# Patient Record
Sex: FEMALE | Race: WHITE | NOT HISPANIC OR LATINO | URBAN - NONMETROPOLITAN AREA
[De-identification: names, ages, dates, MRNs, and addresses within clinical notes are randomized per-mention and may not be internally consistent; named-entity substitution may affect disease eponyms.]

---

## 2017-01-02 ENCOUNTER — OFFICE VISIT (OUTPATIENT)
Dept: RETAIL CLINIC | Facility: CLINIC | Age: 62
End: 2017-01-02

## 2017-01-02 VITALS
BODY MASS INDEX: 37.42 KG/M2 | HEART RATE: 69 BPM | SYSTOLIC BLOOD PRESSURE: 118 MMHG | TEMPERATURE: 98.8 F | DIASTOLIC BLOOD PRESSURE: 68 MMHG | WEIGHT: 218 LBS | RESPIRATION RATE: 18 BRPM | OXYGEN SATURATION: 97 %

## 2017-01-02 DIAGNOSIS — J01.00 SUBACUTE MAXILLARY SINUSITIS: Primary | ICD-10-CM

## 2017-01-02 PROCEDURE — 99213 OFFICE O/P EST LOW 20 MIN: CPT | Performed by: NURSE PRACTITIONER

## 2017-01-02 RX ORDER — AZITHROMYCIN 250 MG/1
TABLET, FILM COATED ORAL
Qty: 6 TABLET | Refills: 0 | Status: SHIPPED | OUTPATIENT
Start: 2017-01-02 | End: 2017-01-29

## 2017-01-02 NOTE — PATIENT INSTRUCTIONS

## 2017-01-02 NOTE — PROGRESS NOTES
Ariane Sigala is a 61 y.o. female.     Sinusitis   This is a recurrent problem. The current episode started in the past 7 days. The problem has been gradually worsening since onset. The maximum temperature recorded prior to her arrival was 100.4 - 100.9 F. The fever has been present for less than 1 day. Her pain is at a severity of 3/10. The pain is mild. Associated symptoms include congestion, coughing, ear pain, headaches, sinus pressure and a sore throat. Past treatments include oral decongestants. The treatment provided mild relief.        The following portions of the patient's history were reviewed and updated as appropriate: allergies, current medications, past family history, past medical history, past social history, past surgical history and problem list.    Review of Systems   HENT: Positive for congestion, ear pain, sinus pressure and sore throat.    Respiratory: Positive for cough.    Neurological: Positive for headaches.   All other systems reviewed and are negative.    Visit Vitals   • /68   • Pulse 69   • Temp 98.8 °F (37.1 °C)   • Resp 18   • Wt 218 lb (98.9 kg)   • SpO2 97%   • BMI 37.42 kg/m2      Objective   Physical Exam   Constitutional: She appears well-developed.   HENT:   Head: Normocephalic.   Right Ear: External ear normal. Tympanic membrane is erythematous.   Left Ear: External ear normal. Tympanic membrane is erythematous.   Nose: Mucosal edema and rhinorrhea present. Right sinus exhibits maxillary sinus tenderness. Left sinus exhibits maxillary sinus tenderness.   Mouth/Throat: Posterior oropharyngeal erythema present.   Eyes: Conjunctivae are normal. Pupils are equal, round, and reactive to light.   Neck: Normal range of motion. Neck supple.   Cardiovascular: Normal rate, regular rhythm and normal heart sounds.    Pulmonary/Chest: Effort normal and breath sounds normal.   Skin: Skin is warm.   Vitals reviewed.      No results found for this or any previous visit.    Assessment/Plan   Aida was seen today for sinusitis.    Diagnoses and all orders for this visit:    Subacute maxillary sinusitis    Please review visit summary for additonal instructions.              Sunita Wilkinson, APRN

## 2017-01-29 ENCOUNTER — OFFICE VISIT (OUTPATIENT)
Dept: RETAIL CLINIC | Facility: CLINIC | Age: 62
End: 2017-01-29

## 2017-01-29 VITALS
WEIGHT: 221 LBS | DIASTOLIC BLOOD PRESSURE: 70 MMHG | OXYGEN SATURATION: 97 % | TEMPERATURE: 98.9 F | HEART RATE: 75 BPM | HEIGHT: 64 IN | SYSTOLIC BLOOD PRESSURE: 118 MMHG | BODY MASS INDEX: 37.73 KG/M2 | RESPIRATION RATE: 16 BRPM

## 2017-01-29 DIAGNOSIS — H66.002 ACUTE SUPPURATIVE OTITIS MEDIA OF LEFT EAR WITHOUT SPONTANEOUS RUPTURE OF TYMPANIC MEMBRANE, RECURRENCE NOT SPECIFIED: ICD-10-CM

## 2017-01-29 DIAGNOSIS — J10.1 INFLUENZA A: Primary | ICD-10-CM

## 2017-01-29 LAB
EXPIRATION DATE: ABNORMAL
FLUAV AG NPH QL: POSITIVE
FLUBV AG NPH QL: NEGATIVE
INTERNAL CONTROL: ABNORMAL
Lab: ABNORMAL

## 2017-01-29 PROCEDURE — 99213 OFFICE O/P EST LOW 20 MIN: CPT | Performed by: NURSE PRACTITIONER

## 2017-01-29 PROCEDURE — 87804 INFLUENZA ASSAY W/OPTIC: CPT | Performed by: NURSE PRACTITIONER

## 2017-01-29 RX ORDER — ASPIRIN 81 MG/1
81 TABLET ORAL DAILY
COMMUNITY

## 2017-01-29 RX ORDER — AMOXICILLIN 875 MG/1
875 TABLET, COATED ORAL 2 TIMES DAILY
Qty: 20 TABLET | Refills: 0 | Status: SHIPPED | OUTPATIENT
Start: 2017-01-29 | End: 2017-02-08

## 2017-01-29 RX ORDER — OSELTAMIVIR PHOSPHATE 75 MG/1
75 CAPSULE ORAL 2 TIMES DAILY
Qty: 10 CAPSULE | Refills: 0 | OUTPATIENT
Start: 2017-01-29 | End: 2017-02-03

## 2017-01-29 NOTE — MR AVS SNAPSHOT
Aida Sigala   1/29/2017 2:15 PM   Office Visit    Dept Phone:  [phone]   Encounter #:  [id number]    Provider:  Provider Bec Montague   Department:  [facility]                Your Full Care Plan              Today's Medication Changes          These changes are accurate as of: 1/29/17  3:34 PM.  If you have any questions, ask your nurse or doctor.               New Medication(s)Ordered:     amoxicillin 875 MG tablet   Commonly known as:  AMOXIL   Take 1 tablet by mouth 2 (Two) Times a Day for 10 days.   Started by:  Gildardo Garza       oseltamivir 75 MG capsule   Commonly known as:  TAMIFLU   Take 1 capsule by mouth 2 (Two) Times a Day for 5 days.   Started by:  Gildardo Garza         Stop taking medication(s)listed here:     azithromycin 250 MG tablet   Commonly known as:  ZITHROMAX Z-CARRINGTON   Stopped by:  Gildardo Garza                Where to Get Your Medications      These medications were sent to [address] [facility] [address] - [phone] [facility] [phone] [place] [address] [address] [place] [id number]     Phone:  [phone]     amoxicillin 875 MG tablet    oseltamivir 75 MG capsule                  Your Updated Medication List          This list is accurate as of: 1/29/17  3:34 PM.  Always use your most recent med list.                amoxicillin 875 MG tablet   Commonly known as:  AMOXIL   Take 1 tablet by mouth 2 (Two) Times a Day for 10 days.       aspirin 81 MG EC tablet       glimepiride 1 MG tablet   Commonly known as:  AMARYL       insulin aspart 100 UNIT/ML injection   Commonly known as:  novoLOG       insulin detemir 100 UNIT/ML injection   Commonly known as:  LEVEMIR       levothyroxine 100 MCG tablet   Commonly known as:  SYNTHROID, LEVOTHROID       lisinopril 10 MG tablet   Commonly known as:  PRINIVIL,ZESTRIL       metFORMIN 1000 MG tablet   Commonly known as:  GLUCOPHAGE    "   nadolol 20 MG tablet   Commonly known as:  CORGARD       oseltamivir 75 MG capsule   Commonly known as:  TAMIFLU   Take 1 capsule by mouth 2 (Two) Times a Day for 5 days.               We Performed the Following     POC Influenza A / B       You Were Diagnosed With        Codes Comments    Influenza A    -  Primary ICD-10-CM: J10.1  ICD-9-CM: 487.1     Acute suppurative otitis media of left ear without spontaneous rupture of tympanic membrane, recurrence not specified     ICD-10-CM: H66.002  ICD-9-CM: 382.00       Instructions    Influenza, Adult  Influenza (\"the flu\") is a viral infection of the respiratory tract. It occurs more often in winter months because people spend more time in close contact with one another. Influenza can make you feel very sick. Influenza easily spreads from person to person (contagious).  CAUSES   Influenza is caused by a virus that infects the respiratory tract. You can catch the virus by breathing in droplets from an infected person's cough or sneeze. You can also catch the virus by touching something that was recently contaminated with the virus and then touching your mouth, nose, or eyes.  RISKS AND COMPLICATIONS  You may be at risk for a more severe case of influenza if you smoke cigarettes, have diabetes, have chronic heart disease (such as heart failure) or lung disease (such as asthma), or if you have a weakened immune system. Elderly people and pregnant women are also at risk for more serious infections. The most common problem of influenza is a lung infection (pneumonia). Sometimes, this problem can require emergency medical care and may be life threatening.  SIGNS AND SYMPTOMS   Symptoms typically last 4 to 10 days and may include:  · Fever.  · Chills.  · Headache, body aches, and muscle aches.  · Sore throat.  · Chest discomfort and cough.  · Poor appetite.  · Weakness or feeling tired.  · Dizziness.  · Nausea or vomiting.  DIAGNOSIS   Diagnosis of influenza is often made " based on your history and a physical exam. A nose or throat swab test can be done to confirm the diagnosis.  TREATMENT   In mild cases, influenza goes away on its own. Treatment is directed at relieving symptoms. For more severe cases, your health care provider may prescribe antiviral medicines to shorten the sickness. Antibiotic medicines are not effective because the infection is caused by a virus, not by bacteria.  HOME CARE INSTRUCTIONS  · Take medicines only as directed by your health care provider.  · Use a cool mist humidifier to make breathing easier.  · Get plenty of rest until your temperature returns to normal. This usually takes 3 to 4 days.  · Drink enough fluid to keep your urine clear or pale yellow.  · Cover your mouth and nose when coughing or sneezing, and wash your hands well to prevent the virus from spreading.  · Stay home from work or school until the fever is gone for at least 1 full day.  PREVENTION   An annual influenza vaccination (flu shot) is the best way to avoid getting influenza. An annual flu shot is now routinely recommended for all adults in the [place]  SEEK MEDICAL CARE IF:  · You experience chest pain, your cough worsens, or you produce more mucus.  · You have nausea, vomiting, or diarrhea.  · Your fever returns or gets worse.  SEEK IMMEDIATE MEDICAL CARE IF:  · You have trouble breathing, you become short of breath, or your skin or nails become bluish.  · You have severe pain or stiffness in the neck.  · You develop a sudden headache, or pain in the face or ear.  · You have nausea or vomiting that you cannot control.  MAKE SURE YOU:   · Understand these instructions.  · Will watch your condition.  · Will get help right away if you are not doing well or get worse.     This information is not intended to replace advice given to you by your health care provider. Make sure you discuss any questions you have with your health care provider.     Document Released: 12/15/2001 Document  Revised: 01/08/2016 Document Reviewed: 03/18/2013  [employer] Interactive Patient Education ©2016 [employer]    Otitis Media, Adult  Otitis media is redness, soreness, and inflammation of the middle ear. Otitis media may be caused by allergies or, most commonly, by infection. Often it occurs as a complication of the common cold.  SIGNS AND SYMPTOMS  Symptoms of otitis media may include:  · Earache.  · Fever.  · Ringing in your ear.  · Headache.  · Leakage of fluid from the ear.  DIAGNOSIS  To diagnose otitis media, your health care provider will examine your ear with an otoscope. This is an instrument that allows your health care provider to see into your ear in order to examine your eardrum. Your health care provider also will ask you questions about your symptoms.  TREATMENT   Typically, otitis media resolves on its own within 3-5 days. Your health care provider may prescribe medicine to ease your symptoms of pain. If otitis media does not resolve within 5 days or is recurrent, your health care provider may prescribe antibiotic medicines if he or she suspects that a bacterial infection is the cause.  HOME CARE INSTRUCTIONS   · If you were prescribed an antibiotic medicine, finish it all even if you start to feel better.  · Take medicines only as directed by your health care provider.  · Keep all follow-up visits as directed by your health care provider.  SEEK MEDICAL CARE IF:  · You have otitis media only in one ear, or bleeding from your nose, or both.  · You notice a lump on your neck.  · You are not getting better in 3-5 days.  · You feel worse instead of better.  SEEK IMMEDIATE MEDICAL CARE IF:   · You have pain that is not controlled with medicine.  · You have swelling, redness, or pain around your ear or stiffness in your neck.  · You notice that part of your face is paralyzed.  · You notice that the bone behind your ear (mastoid) is tender when you touch it.  MAKE SURE YOU:   · Understand these  "instructions.  · Will watch your condition.  · Will get help right away if you are not doing well or get worse.     This information is not intended to replace advice given to you by your health care provider. Make sure you discuss any questions you have with your health care provider.     Document Released: 09/22/2005 Document Revised: 01/08/2016 Document Reviewed: 07/15/2014  [employer] Interactive Patient Education ©2016 [employer]       Patient Instructions History      Upcoming Appointments     Visit Type Date Time Department    OFFICE VISIT 1/29/2017  2:15 PM [facility]      [employer] Signup     Our records indicate that you have declined [facility] [employer] signup. If you would like to sign up for [employer], please email [email or web address] or call [phone] to obtain an activation code.             Other Info from Your Visit           Allergies     Sulfa Antibiotics Allergy Hives      Reason for Visit     Fever     Headache     Earache     Generalized Body Aches           Vital Signs     Blood Pressure Pulse Temperature Respirations Height Weight    118/70 75 98.9 °F (37.2 °C) (Oral) 16 64\" (162.6 cm) 221 lb (100 kg)    Oxygen Saturation Body Mass Index Smoking Status             97% 37.93 kg/m2 Never Smoker         Problems and Diagnoses Noted     Influenza A    -  Primary    Middle ear infection          Results     POC Influenza A / B      Component Value Standard Range & Units    Rapid Influenza A Ag positive     Rapid Influenza B Ag negative     Internal Control Passed Passed    Lot Number [id number]     Expiration Date 12/2017                     "

## 2017-01-29 NOTE — PROGRESS NOTES
Subjective   Aida Sigala is a 61 y.o. female.   Chief Complaint   Patient presents with   • Fever   • Headache   • Earache   • Generalized Body Aches      Fever    This is a new problem. The current episode started yesterday. The problem occurs intermittently. The problem has been waxing and waning. The maximum temperature noted was 101 to 101.9 F. Associated symptoms include congestion, coughing, ear pain, headaches and muscle aches. Pertinent negatives include no abdominal pain, chest pain, diarrhea, nausea, rash, sore throat, vomiting or wheezing. She has tried NSAIDs for the symptoms. The treatment provided mild relief.   Headache    This is a new problem. The current episode started yesterday. The problem occurs constantly. The pain is located in the frontal region. The pain does not radiate. The pain quality is similar to prior headaches. The quality of the pain is described as aching. The pain is moderate. Associated symptoms include coughing, ear pain, a fever, muscle aches, rhinorrhea and sinus pressure. Pertinent negatives include no abdominal pain, dizziness, hearing loss, nausea, neck pain, sore throat or vomiting. Nothing aggravates the symptoms. She has tried NSAIDs for the symptoms. The treatment provided mild relief.   Earache    There is pain in both ears. This is a new problem. The current episode started in the past 7 days. The problem occurs every few minutes. The problem has been gradually worsening. The maximum temperature recorded prior to her arrival was 101 - 101.9 F. The fever has been present for 1 to 2 days. The pain is moderate. Associated symptoms include coughing, headaches and rhinorrhea. Pertinent negatives include no abdominal pain, diarrhea, hearing loss, neck pain, rash, sore throat or vomiting. She has tried NSAIDs for the symptoms. The treatment provided mild relief.        The following portions of the patient's history were reviewed and updated as appropriate: allergies,  current medications, past family history, past medical history, past social history, past surgical history and problem list.    Review of Systems   Constitutional: Positive for chills, fatigue and fever. Negative for diaphoresis.   HENT: Positive for congestion, ear pain, postnasal drip, rhinorrhea and sinus pressure. Negative for hearing loss and sore throat.    Respiratory: Positive for cough. Negative for choking, chest tightness, shortness of breath, wheezing and stridor.    Cardiovascular: Negative for chest pain.   Gastrointestinal: Negative for abdominal pain, diarrhea, nausea and vomiting.   Musculoskeletal: Positive for myalgias. Negative for arthralgias and neck pain.   Skin: Negative for rash.   Neurological: Positive for headaches. Negative for dizziness.   Hematological: Negative.    Psychiatric/Behavioral: Negative.        Objective   Allergies   Allergen Reactions   • Sulfa Antibiotics Hives       Physical Exam   Constitutional: She is oriented to person, place, and time. She appears well-developed and well-nourished. She appears ill. No distress.   HENT:   Head: Normocephalic and atraumatic.   Right Ear: External ear and ear canal normal. Tympanic membrane is injected and bulging. A middle ear effusion is present.   Left Ear: External ear and ear canal normal. Tympanic membrane is erythematous and bulging. A middle ear effusion is present.   Nose: Mucosal edema and rhinorrhea present. Right sinus exhibits no maxillary sinus tenderness and no frontal sinus tenderness. Left sinus exhibits no maxillary sinus tenderness and no frontal sinus tenderness.   Mouth/Throat: Uvula is midline and mucous membranes are normal. Posterior oropharyngeal erythema (mild) present. No posterior oropharyngeal edema.   Neck: Normal range of motion. Neck supple.   Cardiovascular: Normal heart sounds.    Pulmonary/Chest: Effort normal and breath sounds normal. No respiratory distress. She has no wheezes. She has no rales.  "  Lymphadenopathy:     She has no cervical adenopathy.   Neurological: She is alert and oriented to person, place, and time.   Skin: Skin is warm and dry. No rash noted. She is not diaphoretic.   Psychiatric: She has a normal mood and affect.       Assessment/Plan   Aida was seen today for fever, headache, earache and generalized body aches.    Diagnoses and all orders for this visit:    Influenza A  -     POC Influenza A / B  -     oseltamivir (TAMIFLU) 75 MG capsule; Take 1 capsule by mouth 2 (Two) Times a Day for 5 days.    Acute suppurative otitis media of left ear without spontaneous rupture of tympanic membrane, recurrence not specified  -     POC Influenza A / B  -     amoxicillin (AMOXIL) 875 MG tablet; Take 1 tablet by mouth 2 (Two) Times a Day for 10 days.      Flu screen positive for Influenza A, negative for Influenza B.           Patient Instructions   Influenza, Adult  Influenza (\"the flu\") is a viral infection of the respiratory tract. It occurs more often in winter months because people spend more time in close contact with one another. Influenza can make you feel very sick. Influenza easily spreads from person to person (contagious).  CAUSES   Influenza is caused by a virus that infects the respiratory tract. You can catch the virus by breathing in droplets from an infected person's cough or sneeze. You can also catch the virus by touching something that was recently contaminated with the virus and then touching your mouth, nose, or eyes.  RISKS AND COMPLICATIONS  You may be at risk for a more severe case of influenza if you smoke cigarettes, have diabetes, have chronic heart disease (such as heart failure) or lung disease (such as asthma), or if you have a weakened immune system. Elderly people and pregnant women are also at risk for more serious infections. The most common problem of influenza is a lung infection (pneumonia). Sometimes, this problem can require emergency medical care and may be " life threatening.  SIGNS AND SYMPTOMS   Symptoms typically last 4 to 10 days and may include:  · Fever.  · Chills.  · Headache, body aches, and muscle aches.  · Sore throat.  · Chest discomfort and cough.  · Poor appetite.  · Weakness or feeling tired.  · Dizziness.  · Nausea or vomiting.  DIAGNOSIS   Diagnosis of influenza is often made based on your history and a physical exam. A nose or throat swab test can be done to confirm the diagnosis.  TREATMENT   In mild cases, influenza goes away on its own. Treatment is directed at relieving symptoms. For more severe cases, your health care provider may prescribe antiviral medicines to shorten the sickness. Antibiotic medicines are not effective because the infection is caused by a virus, not by bacteria.  HOME CARE INSTRUCTIONS  · Take medicines only as directed by your health care provider.  · Use a cool mist humidifier to make breathing easier.  · Get plenty of rest until your temperature returns to normal. This usually takes 3 to 4 days.  · Drink enough fluid to keep your urine clear or pale yellow.  · Cover your mouth and nose when coughing or sneezing, and wash your hands well to prevent the virus from spreading.  · Stay home from work or school until the fever is gone for at least 1 full day.  PREVENTION   An annual influenza vaccination (flu shot) is the best way to avoid getting influenza. An annual flu shot is now routinely recommended for all adults in the [place]  SEEK MEDICAL CARE IF:  · You experience chest pain, your cough worsens, or you produce more mucus.  · You have nausea, vomiting, or diarrhea.  · Your fever returns or gets worse.  SEEK IMMEDIATE MEDICAL CARE IF:  · You have trouble breathing, you become short of breath, or your skin or nails become bluish.  · You have severe pain or stiffness in the neck.  · You develop a sudden headache, or pain in the face or ear.  · You have nausea or vomiting that you cannot control.  MAKE SURE YOU:   · Understand  these instructions.  · Will watch your condition.  · Will get help right away if you are not doing well or get worse.     This information is not intended to replace advice given to you by your health care provider. Make sure you discuss any questions you have with your health care provider.     Document Released: 12/15/2001 Document Revised: 01/08/2016 Document Reviewed: 03/18/2013  [employer] Interactive Patient Education ©2016 [employer]    Otitis Media, Adult  Otitis media is redness, soreness, and inflammation of the middle ear. Otitis media may be caused by allergies or, most commonly, by infection. Often it occurs as a complication of the common cold.  SIGNS AND SYMPTOMS  Symptoms of otitis media may include:  · Earache.  · Fever.  · Ringing in your ear.  · Headache.  · Leakage of fluid from the ear.  DIAGNOSIS  To diagnose otitis media, your health care provider will examine your ear with an otoscope. This is an instrument that allows your health care provider to see into your ear in order to examine your eardrum. Your health care provider also will ask you questions about your symptoms.  TREATMENT   Typically, otitis media resolves on its own within 3-5 days. Your health care provider may prescribe medicine to ease your symptoms of pain. If otitis media does not resolve within 5 days or is recurrent, your health care provider may prescribe antibiotic medicines if he or she suspects that a bacterial infection is the cause.  HOME CARE INSTRUCTIONS   · If you were prescribed an antibiotic medicine, finish it all even if you start to feel better.  · Take medicines only as directed by your health care provider.  · Keep all follow-up visits as directed by your health care provider.  SEEK MEDICAL CARE IF:  · You have otitis media only in one ear, or bleeding from your nose, or both.  · You notice a lump on your neck.  · You are not getting better in 3-5 days.  · You feel worse instead of better.  SEEK IMMEDIATE  MEDICAL CARE IF:   · You have pain that is not controlled with medicine.  · You have swelling, redness, or pain around your ear or stiffness in your neck.  · You notice that part of your face is paralyzed.  · You notice that the bone behind your ear (mastoid) is tender when you touch it.  MAKE SURE YOU:   · Understand these instructions.  · Will watch your condition.  · Will get help right away if you are not doing well or get worse.     This information is not intended to replace advice given to you by your health care provider. Make sure you discuss any questions you have with your health care provider.     Document Released: 09/22/2005 Document Revised: 01/08/2016 Document Reviewed: 07/15/2014  [employer] Interactive Patient Education ©2016 [employer]

## 2017-01-29 NOTE — PATIENT INSTRUCTIONS
"Influenza, Adult  Influenza (\"the flu\") is a viral infection of the respiratory tract. It occurs more often in winter months because people spend more time in close contact with one another. Influenza can make you feel very sick. Influenza easily spreads from person to person (contagious).  CAUSES   Influenza is caused by a virus that infects the respiratory tract. You can catch the virus by breathing in droplets from an infected person's cough or sneeze. You can also catch the virus by touching something that was recently contaminated with the virus and then touching your mouth, nose, or eyes.  RISKS AND COMPLICATIONS  You may be at risk for a more severe case of influenza if you smoke cigarettes, have diabetes, have chronic heart disease (such as heart failure) or lung disease (such as asthma), or if you have a weakened immune system. Elderly people and pregnant women are also at risk for more serious infections. The most common problem of influenza is a lung infection (pneumonia). Sometimes, this problem can require emergency medical care and may be life threatening.  SIGNS AND SYMPTOMS   Symptoms typically last 4 to 10 days and may include:  · Fever.  · Chills.  · Headache, body aches, and muscle aches.  · Sore throat.  · Chest discomfort and cough.  · Poor appetite.  · Weakness or feeling tired.  · Dizziness.  · Nausea or vomiting.  DIAGNOSIS   Diagnosis of influenza is often made based on your history and a physical exam. A nose or throat swab test can be done to confirm the diagnosis.  TREATMENT   In mild cases, influenza goes away on its own. Treatment is directed at relieving symptoms. For more severe cases, your health care provider may prescribe antiviral medicines to shorten the sickness. Antibiotic medicines are not effective because the infection is caused by a virus, not by bacteria.  HOME CARE INSTRUCTIONS  · Take medicines only as directed by your health care provider.  · Use a cool mist humidifier " to make breathing easier.  · Get plenty of rest until your temperature returns to normal. This usually takes 3 to 4 days.  · Drink enough fluid to keep your urine clear or pale yellow.  · Cover your mouth and nose when coughing or sneezing, and wash your hands well to prevent the virus from spreading.  · Stay home from work or school until the fever is gone for at least 1 full day.  PREVENTION   An annual influenza vaccination (flu shot) is the best way to avoid getting influenza. An annual flu shot is now routinely recommended for all adults in the [place]  SEEK MEDICAL CARE IF:  · You experience chest pain, your cough worsens, or you produce more mucus.  · You have nausea, vomiting, or diarrhea.  · Your fever returns or gets worse.  SEEK IMMEDIATE MEDICAL CARE IF:  · You have trouble breathing, you become short of breath, or your skin or nails become bluish.  · You have severe pain or stiffness in the neck.  · You develop a sudden headache, or pain in the face or ear.  · You have nausea or vomiting that you cannot control.  MAKE SURE YOU:   · Understand these instructions.  · Will watch your condition.  · Will get help right away if you are not doing well or get worse.     This information is not intended to replace advice given to you by your health care provider. Make sure you discuss any questions you have with your health care provider.     Document Released: 12/15/2001 Document Revised: 01/08/2016 Document Reviewed: 03/18/2013  [employer] Interactive Patient Education ©2016 [employer]    Otitis Media, Adult  Otitis media is redness, soreness, and inflammation of the middle ear. Otitis media may be caused by allergies or, most commonly, by infection. Often it occurs as a complication of the common cold.  SIGNS AND SYMPTOMS  Symptoms of otitis media may include:  · Earache.  · Fever.  · Ringing in your ear.  · Headache.  · Leakage of fluid from the ear.  DIAGNOSIS  To diagnose otitis media, your health care  provider will examine your ear with an otoscope. This is an instrument that allows your health care provider to see into your ear in order to examine your eardrum. Your health care provider also will ask you questions about your symptoms.  TREATMENT   Typically, otitis media resolves on its own within 3-5 days. Your health care provider may prescribe medicine to ease your symptoms of pain. If otitis media does not resolve within 5 days or is recurrent, your health care provider may prescribe antibiotic medicines if he or she suspects that a bacterial infection is the cause.  HOME CARE INSTRUCTIONS   · If you were prescribed an antibiotic medicine, finish it all even if you start to feel better.  · Take medicines only as directed by your health care provider.  · Keep all follow-up visits as directed by your health care provider.  SEEK MEDICAL CARE IF:  · You have otitis media only in one ear, or bleeding from your nose, or both.  · You notice a lump on your neck.  · You are not getting better in 3-5 days.  · You feel worse instead of better.  SEEK IMMEDIATE MEDICAL CARE IF:   · You have pain that is not controlled with medicine.  · You have swelling, redness, or pain around your ear or stiffness in your neck.  · You notice that part of your face is paralyzed.  · You notice that the bone behind your ear (mastoid) is tender when you touch it.  MAKE SURE YOU:   · Understand these instructions.  · Will watch your condition.  · Will get help right away if you are not doing well or get worse.     This information is not intended to replace advice given to you by your health care provider. Make sure you discuss any questions you have with your health care provider.     Document Released: 09/22/2005 Document Revised: 01/08/2016 Document Reviewed: 07/15/2014  [employer] Interactive Patient Education ©2016 [employer]

## 2017-05-12 ENCOUNTER — OFFICE VISIT (OUTPATIENT)
Dept: RETAIL CLINIC | Facility: CLINIC | Age: 62
End: 2017-05-12

## 2017-05-12 VITALS
HEIGHT: 64 IN | RESPIRATION RATE: 18 BRPM | TEMPERATURE: 98.4 F | SYSTOLIC BLOOD PRESSURE: 126 MMHG | HEART RATE: 57 BPM | OXYGEN SATURATION: 98 % | DIASTOLIC BLOOD PRESSURE: 78 MMHG | WEIGHT: 223.6 LBS | BODY MASS INDEX: 38.17 KG/M2

## 2017-05-12 DIAGNOSIS — J01.40 ACUTE PANSINUSITIS, RECURRENCE NOT SPECIFIED: Primary | ICD-10-CM

## 2017-05-12 PROCEDURE — 99213 OFFICE O/P EST LOW 20 MIN: CPT | Performed by: NURSE PRACTITIONER

## 2017-05-12 RX ORDER — IBUPROFEN 200 MG
200 TABLET ORAL EVERY 6 HOURS PRN
COMMUNITY
End: 2020-10-09

## 2017-05-12 RX ORDER — AMOXICILLIN 875 MG/1
875 TABLET, COATED ORAL 2 TIMES DAILY
Qty: 20 TABLET | Refills: 0 | Status: SHIPPED | OUTPATIENT
Start: 2017-05-12 | End: 2017-05-22

## 2020-10-09 ENCOUNTER — OFFICE VISIT (OUTPATIENT)
Dept: ENDOCRINOLOGY | Facility: CLINIC | Age: 65
End: 2020-10-09

## 2020-10-09 VITALS
SYSTOLIC BLOOD PRESSURE: 122 MMHG | DIASTOLIC BLOOD PRESSURE: 60 MMHG | WEIGHT: 199 LBS | OXYGEN SATURATION: 98 % | HEIGHT: 64 IN | HEART RATE: 64 BPM | BODY MASS INDEX: 33.97 KG/M2

## 2020-10-09 DIAGNOSIS — E03.9 ACQUIRED HYPOTHYROIDISM: ICD-10-CM

## 2020-10-09 DIAGNOSIS — IMO0002 DIABETES MELLITUS TYPE 2, UNCONTROLLED, WITH COMPLICATIONS: Primary | ICD-10-CM

## 2020-10-09 DIAGNOSIS — E66.9 CLASS 1 OBESITY WITH BODY MASS INDEX (BMI) OF 34.0 TO 34.9 IN ADULT, UNSPECIFIED OBESITY TYPE, UNSPECIFIED WHETHER SERIOUS COMORBIDITY PRESENT: ICD-10-CM

## 2020-10-09 DIAGNOSIS — I10 BENIGN ESSENTIAL HYPERTENSION: ICD-10-CM

## 2020-10-09 DIAGNOSIS — E78.2 MIXED HYPERLIPIDEMIA: ICD-10-CM

## 2020-10-09 PROBLEM — R80.9 MICROALBUMINURIA: Status: ACTIVE | Noted: 2020-10-09

## 2020-10-09 LAB
EXPIRATION DATE: ABNORMAL
EXPIRATION DATE: NORMAL
GLUCOSE BLDC GLUCOMTR-MCNC: 142 MG/DL (ref 70–130)
HBA1C MFR BLD: 7.8 %
Lab: ABNORMAL
Lab: NORMAL

## 2020-10-09 PROCEDURE — 99213 OFFICE O/P EST LOW 20 MIN: CPT | Performed by: PHYSICIAN ASSISTANT

## 2020-10-09 PROCEDURE — 83036 HEMOGLOBIN GLYCOSYLATED A1C: CPT | Performed by: PHYSICIAN ASSISTANT

## 2020-10-09 PROCEDURE — 82947 ASSAY GLUCOSE BLOOD QUANT: CPT | Performed by: PHYSICIAN ASSISTANT

## 2020-10-09 RX ORDER — DULAGLUTIDE 1.5 MG/.5ML
0.5 INJECTION, SOLUTION SUBCUTANEOUS WEEKLY
COMMUNITY
Start: 2020-09-17 | End: 2020-10-09 | Stop reason: SDUPTHER

## 2020-10-09 RX ORDER — LEVOTHYROXINE SODIUM 125 MCG
125 TABLET ORAL DAILY
Qty: 90 TABLET | Refills: 3 | Status: SHIPPED | OUTPATIENT
Start: 2020-10-09 | End: 2021-11-08

## 2020-10-09 RX ORDER — DULAGLUTIDE 1.5 MG/.5ML
0.5 INJECTION, SOLUTION SUBCUTANEOUS WEEKLY
Qty: 6 ML | Refills: 3 | Status: SHIPPED | OUTPATIENT
Start: 2020-10-09 | End: 2021-06-18

## 2020-10-09 RX ORDER — LEVOTHYROXINE SODIUM 125 MCG
125 TABLET ORAL DAILY
COMMUNITY
End: 2020-10-09 | Stop reason: SDUPTHER

## 2020-10-09 NOTE — PROGRESS NOTES
"     Office Note      Date: 10/09/2020  Patient Name: Aida Sigala  MRN: [id number]  : 1955    Chief Complaint   Patient presents with   • Diabetes     follow up       History of Present Illness:   Aida Sigala is a 65 y.o. female who presents today for diabetes. She remains on Trulicity, Jardiance, metformin, and Levemir. Tolerating meds okay. She is testing FSBS 3x per day. Fasting FSBS have been <140, occasionally higher. Readings later in the day 140-160. She denies any problems with her feet. Eye exam up to date. She remains on statin, ACE-I, and ASA.      Subjective      Review of Systems:   Review of Systems   Constitutional: Negative.    Cardiovascular: Negative.    Gastrointestinal: Negative.    Endocrine: Negative.    Neurological: Negative.        The following portions of the patient's history were reviewed and updated as appropriate: allergies, current medications, past family history, past medical history, past social history, past surgical history and problem list.    Objective     Vitals:    10/09/20 1000   BP: 122/60   Pulse: 64   SpO2: 98%   Weight: 90.3 kg (199 lb)   Height: 162.6 cm (64\")     Body mass index is 34.16 kg/m².    Physical Exam  Vitals signs reviewed.   Constitutional:       Appearance: Normal appearance.   Neurological:      Mental Status: She is alert.   Psychiatric:         Mood and Affect: Mood and affect normal.         HEMOGLOBIN A1C  Lab Results   Component Value Date    HGBA1C 7.6 10/09/2020         Current Outpatient Medications   Medication Instructions   • aspirin 81 mg, Oral, Daily   • Empagliflozin 25 mg, Oral, Daily   • insulin detemir (LEVEMIR) 50 Units, Subcutaneous, 2 times daily   • lisinopril (PRINIVIL,ZESTRIL) 10 mg, Oral, Daily   • metFORMIN (GLUCOPHAGE) 1,000 mg, Oral, 2 Times Daily With Meals   • nadolol (CORGARD) 20 mg, Oral, Daily   • sertraline (ZOLOFT) 50 MG tablet No dose, route, or frequency recorded.   • Synthroid 125 mcg, Oral, Daily   • " Trulicity 0.5 mg, Subcutaneous, Weekly       Assessment / Plan      Assessment & Plan:  1. Diabetes mellitus type 2, uncontrolled, with complications (CMS/[facility])  A1c above goal, but has significantly improved. She will continue to work on healthy dietary choices, regular exercise.  - POC Glucose, Blood  - POC Glycosylated Hemoglobin (Hb A1C)  - Empagliflozin 25 MG tablet; Take 25 mg by mouth Daily.  Dispense: 90 tablet; Refill: 3  - insulin detemir (LEVEMIR) 100 UNIT/ML injection; Inject 50 Units under the skin into the appropriate area as directed 2 (two) times a day.  Dispense: 90 mL; Refill: 3  - metFORMIN (GLUCOPHAGE) 1000 MG tablet; Take 1 tablet by mouth 2 (Two) Times a Day With Meals.  Dispense: 180 tablet; Refill: 3  - Trulicity 1.5 MG/0.5ML solution pen-injector; Inject 0.5 mg under the skin into the appropriate area as directed 1 (One) Time Per Week.  Dispense: 6 mL; Refill: 3    2. Benign essential hypertension  BP okay.     3. Mixed hyperlipidemia  Continue statin. Check lipids next visit.    4. Acquired hypothyroidism  Continue current tx.  - Synthroid 125 MCG tablet; Take 1 tablet by mouth Daily.  Dispense: 90 tablet; Refill: 3    5. Class 1 obesity with body mass index (BMI) of 34.0 to 34.9 in adult, unspecified obesity type, unspecified whether serious comorbidity present        Return in about 3 months (around 1/9/2021) for Fasting follow up.     SOUTH Rodriguez  10/09/2020

## 2021-02-11 RX ORDER — LISINOPRIL 10 MG/1
TABLET ORAL
Qty: 90 TABLET | Refills: 1 | Status: SHIPPED | OUTPATIENT
Start: 2021-02-11 | End: 2021-07-23

## 2021-02-26 RX ORDER — SIMVASTATIN 40 MG
TABLET ORAL
Qty: 90 TABLET | Refills: 1 | Status: SHIPPED | OUTPATIENT
Start: 2021-02-26

## 2021-03-12 ENCOUNTER — TELEPHONE (OUTPATIENT)
Dept: ENDOCRINOLOGY | Facility: CLINIC | Age: 66
End: 2021-03-12

## 2021-03-12 ENCOUNTER — OFFICE VISIT (OUTPATIENT)
Dept: ENDOCRINOLOGY | Facility: CLINIC | Age: 66
End: 2021-03-12

## 2021-03-12 VITALS
SYSTOLIC BLOOD PRESSURE: 122 MMHG | BODY MASS INDEX: 34.04 KG/M2 | HEIGHT: 64 IN | OXYGEN SATURATION: 99 % | DIASTOLIC BLOOD PRESSURE: 62 MMHG | TEMPERATURE: 97.1 F | HEART RATE: 62 BPM | WEIGHT: 199.4 LBS

## 2021-03-12 DIAGNOSIS — E78.2 MIXED HYPERLIPIDEMIA: Chronic | ICD-10-CM

## 2021-03-12 DIAGNOSIS — E11.65 TYPE 2 DIABETES MELLITUS WITH HYPERGLYCEMIA, WITH LONG-TERM CURRENT USE OF INSULIN (HCC): Primary | Chronic | ICD-10-CM

## 2021-03-12 DIAGNOSIS — I10 BENIGN ESSENTIAL HYPERTENSION: Chronic | ICD-10-CM

## 2021-03-12 DIAGNOSIS — Z79.4 TYPE 2 DIABETES MELLITUS WITH HYPERGLYCEMIA, WITH LONG-TERM CURRENT USE OF INSULIN (HCC): Primary | Chronic | ICD-10-CM

## 2021-03-12 DIAGNOSIS — E03.9 ACQUIRED HYPOTHYROIDISM: Chronic | ICD-10-CM

## 2021-03-12 PROBLEM — R80.9 MICROALBUMINURIA: Chronic | Status: ACTIVE | Noted: 2020-10-09

## 2021-03-12 PROBLEM — K42.9 UMBILICAL HERNIA: Status: ACTIVE | Noted: 2018-07-05

## 2021-03-12 PROBLEM — E66.9 OBESITY: Status: ACTIVE | Noted: 2018-09-13

## 2021-03-12 PROBLEM — IMO0002 DIABETES MELLITUS TYPE 2, UNCONTROLLED, WITH COMPLICATIONS: Status: ACTIVE | Noted: 2021-03-12

## 2021-03-12 PROBLEM — E66.9 OBESITY: Chronic | Status: ACTIVE | Noted: 2020-10-09

## 2021-03-12 PROBLEM — K42.9 UMBILICAL HERNIA: Status: RESOLVED | Noted: 2018-07-05 | Resolved: 2021-03-12

## 2021-03-12 LAB
EXPIRATION DATE: NORMAL
HBA1C MFR BLD: 7.9 %
Lab: NORMAL

## 2021-03-12 PROCEDURE — 99214 OFFICE O/P EST MOD 30 MIN: CPT | Performed by: PHYSICIAN ASSISTANT

## 2021-03-12 PROCEDURE — 83036 HEMOGLOBIN GLYCOSYLATED A1C: CPT | Performed by: PHYSICIAN ASSISTANT

## 2021-03-12 RX ORDER — ALCOHOL ANTISEPTIC PADS
PADS, MEDICATED (EA) TOPICAL
COMMUNITY

## 2021-03-12 NOTE — TELEPHONE ENCOUNTER
Patient reported having labs about 2 months ago with her PCP, Dr. Pasquale Ruth.  Please call for copy of results.  Thank you.

## 2021-03-12 NOTE — PROGRESS NOTES
"     Office Note      Date: 2021  Patient Name: Aida Sigala  MRN: [id number]  : 1955    Chief Complaint   Patient presents with   • Diabetes       History of Present Illness:   Aida Sigala is a 65 y.o. female who presents today for type 2 diabetes.  She remains on Levemir, Trulicity, Jardiance, and metformin.  Tolerating medications well.  She is testing FSBS 2 times per day.  Morning readings are usually 125-150 (lowest in the 90s), afternoon 110-180 (2-3 hours after lunch).  She denies any hypoglycemia.  She reports that her diet isn't too bad.  She reports drinking a rajesh a couple nights per week in the past few months.  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  She is helping with home school for grandchildren.  She denies any sores on her feet.  She denies pain.  She notes some numbness bilaterally region of 5th MTP joints.  Eye exam up to date.  She reports developing stye in left eye.  She remains on statin, ACE inhibitor, and aspirin.  She remains on Synthroid 125 mcg/day.  She reports taking correctly regularly.  She reports that her insurance no longer wants to cover the brand name Synthroid.  She reports falling on ice several weeks ago.  She denies serious injury.  She reports developing rash after having first Moderna Covid-19 vaccine.      Subjective      Review of Systems:   Review of Systems   Constitutional: Negative.    Cardiovascular: Negative.    Gastrointestinal: Negative.    Endocrine: Negative.    Neurological: Positive for numbness.       The following portions of the patient's history were reviewed and updated as appropriate: allergies, current medications, past family history, past medical history, past social history, past surgical history and problem list.    Objective     Vitals:    21 1119   BP: 122/62   Pulse: 62   Temp: 97.1 °F (36.2 °C)   TempSrc: Infrared   SpO2: 99%   Weight: 90.4 kg (199 lb 6.4 oz)   Height: 162.6 cm (64\") " "  PainSc: 0-No pain     Body mass index is 34.23 kg/m².    Physical Exam  Vitals reviewed.   Constitutional:       General: She is not in acute distress.  Cardiovascular:      Pulses:           Dorsalis pedis pulses are 2+ on the right side and 2+ on the left side.        Posterior tibial pulses are 2+ on the right side and 2+ on the left side.   Musculoskeletal:      Right foot: Deformity (tailor's bunion) present.      Left foot: Deformity (tailor's bunion) present.   Feet:      Right foot:      Protective Sensation: 10 sites tested. 10 sites sensed.      Skin integrity: Callus (mild) present. No ulcer or skin breakdown.      Toenail Condition: Right toenails are normal.      Left foot:      Protective Sensation: 10 sites tested. 10 sites sensed.      Skin integrity: Callus (mild) present. No ulcer or skin breakdown.      Toenail Condition: Left toenails are normal.   Neurological:      Mental Status: She is alert and oriented to person, place, and time.   Psychiatric:         Mood and Affect: Mood and affect normal.         HEMOGLOBIN A1C  Lab Results   Component Value Date    HGBA1C 7.9 03/12/2021    HGBA1C 7.8 10/09/2020         Current Outpatient Medications   Medication Instructions   • aspirin 81 mg, Oral, Daily   • Empagliflozin 25 mg, Oral, Daily   • glucose blood test strip OneTouch Verio test strips   Three times a day  E11.8   • insulin detemir (LEVEMIR) 50 Units, Subcutaneous, 2 times daily   • Insulin Pen Needle (NovoFine) 32G X 6 MM [facility] Novofine 32 32 gauge x 1/4\" needle   5x /day   • lisinopril (PRINIVIL,ZESTRIL) 10 MG tablet TAKE 1 TABLET DAILY   • metFORMIN (GLUCOPHAGE) 1,000 mg, Oral, 2 Times Daily With Meals   • nadolol (CORGARD) 20 mg, Oral, Daily   • sertraline (ZOLOFT) 50 MG tablet No dose, route, or frequency recorded.   • simvastatin (ZOCOR) 40 MG tablet TAKE 1 TABLET DAILY   • Synthroid 125 mcg, Oral, Daily   • Trulicity 0.5 mg, Subcutaneous, Weekly   • Ultra Thin Lancets 31G misc Ultra " Thin Lancets 31 gauge       Assessment / Plan      Assessment & Plan:  1. Type 2 diabetes mellitus with hyperglycemia, with long-term current use of insulin (CMS/[facility])  A1c above goal.  She will continue to work on diet and physical activity.  Recommend testing 2-hour postprandials to see how food is affecting her glucose.  Reviewed BG goals.  Continue Levemir, Trulicity, Jardiance, and metformin.  - POC Glycosylated Hemoglobin (Hb A1C)    2. Benign essential hypertension  BP okay.  Continue lisinopril.    3. Mixed hyperlipidemia  Continue simvastatin.    4. Acquired hypothyroidism  Continue Synthroid.  We will try to get prior authorization.  Advised that we need insurance denial notice from pharmacy.  If denied, then she can get Synthroid from [facility] or we can change to a different brand name.    She reported having labs about 2 months ago.  Will review those and then order additional labs if needed.    Return in about 3 months (around 6/12/2021) for Next scheduled follow up.     SOUTH Rodriguez  Endocrinology  03/12/2021

## 2021-03-15 RX ORDER — METFORMIN HYDROCHLORIDE 500 MG/1
TABLET, EXTENDED RELEASE ORAL
Qty: 360 TABLET | Refills: 1 | Status: SHIPPED | OUTPATIENT
Start: 2021-03-15 | End: 2021-09-13

## 2021-03-17 ENCOUNTER — TELEPHONE (OUTPATIENT)
Dept: ENDOCRINOLOGY | Facility: CLINIC | Age: 66
End: 2021-03-17

## 2021-03-17 NOTE — TELEPHONE ENCOUNTER
Please let her know that I reviewed lab results that she had last month.  We do not need to check any further labs right now.  Will check urine protein at her next visit.  Thanks.

## 2021-05-18 DIAGNOSIS — IMO0002 DIABETES MELLITUS TYPE 2, UNCONTROLLED, WITH COMPLICATIONS: ICD-10-CM

## 2021-06-16 ENCOUNTER — PATIENT MESSAGE (OUTPATIENT)
Dept: ENDOCRINOLOGY | Facility: CLINIC | Age: 66
End: 2021-06-16

## 2021-06-16 DIAGNOSIS — IMO0002 DIABETES MELLITUS TYPE 2, UNCONTROLLED, WITH COMPLICATIONS: ICD-10-CM

## 2021-06-16 NOTE — TELEPHONE ENCOUNTER
----- Message from Aida Sigala sent at 6/16/2021  1:05 PM EDT -----  Regarding: Prescription Question  Contact: [phone]  I will be traveling June 19-26.  My levimir  is due to ship on June 19 but needs refrigerated - I stopped the mail shipment and now need a refill called in to  [facility] () in [place] so I can  the meds and have them refrigerated before leaving.  Could you call that in to be picked up by Friday afternoon please.

## 2021-06-18 ENCOUNTER — LAB (OUTPATIENT)
Dept: LAB | Facility: HOSPITAL | Age: 66
End: 2021-06-18

## 2021-06-18 ENCOUNTER — OFFICE VISIT (OUTPATIENT)
Dept: ENDOCRINOLOGY | Facility: CLINIC | Age: 66
End: 2021-06-18

## 2021-06-18 VITALS
SYSTOLIC BLOOD PRESSURE: 124 MMHG | DIASTOLIC BLOOD PRESSURE: 74 MMHG | BODY MASS INDEX: 33.6 KG/M2 | WEIGHT: 196.8 LBS | HEART RATE: 57 BPM | HEIGHT: 64 IN | OXYGEN SATURATION: 97 %

## 2021-06-18 DIAGNOSIS — E11.65 TYPE 2 DIABETES MELLITUS WITH HYPERGLYCEMIA, WITH LONG-TERM CURRENT USE OF INSULIN (HCC): Chronic | ICD-10-CM

## 2021-06-18 DIAGNOSIS — R80.9 MICROALBUMINURIA: ICD-10-CM

## 2021-06-18 DIAGNOSIS — Z79.4 TYPE 2 DIABETES MELLITUS WITH HYPERGLYCEMIA, WITH LONG-TERM CURRENT USE OF INSULIN (HCC): Primary | Chronic | ICD-10-CM

## 2021-06-18 DIAGNOSIS — Z79.4 TYPE 2 DIABETES MELLITUS WITH HYPERGLYCEMIA, WITH LONG-TERM CURRENT USE OF INSULIN (HCC): Chronic | ICD-10-CM

## 2021-06-18 DIAGNOSIS — E11.65 TYPE 2 DIABETES MELLITUS WITH HYPERGLYCEMIA, WITH LONG-TERM CURRENT USE OF INSULIN (HCC): Primary | Chronic | ICD-10-CM

## 2021-06-18 LAB
ALBUMIN UR-MCNC: 11.2 MG/DL
CREAT UR-MCNC: 58.5 MG/DL
EXPIRATION DATE: NORMAL
EXPIRATION DATE: NORMAL
GLUCOSE BLDC GLUCOMTR-MCNC: 118 MG/DL (ref 70–130)
HBA1C MFR BLD: 7.6 %
Lab: NORMAL
Lab: NORMAL
MICROALBUMIN/CREAT UR: 191.5 MG/G

## 2021-06-18 PROCEDURE — 99213 OFFICE O/P EST LOW 20 MIN: CPT | Performed by: PHYSICIAN ASSISTANT

## 2021-06-18 PROCEDURE — 82043 UR ALBUMIN QUANTITATIVE: CPT

## 2021-06-18 PROCEDURE — 82570 ASSAY OF URINE CREATININE: CPT

## 2021-06-18 PROCEDURE — 83036 HEMOGLOBIN GLYCOSYLATED A1C: CPT | Performed by: PHYSICIAN ASSISTANT

## 2021-06-18 PROCEDURE — 82947 ASSAY GLUCOSE BLOOD QUANT: CPT | Performed by: PHYSICIAN ASSISTANT

## 2021-06-18 RX ORDER — DULAGLUTIDE 1.5 MG/.5ML
1.5 INJECTION, SOLUTION SUBCUTANEOUS WEEKLY
Qty: 6 ML | Refills: 3 | Status: SHIPPED | OUTPATIENT
Start: 2021-06-18 | End: 2021-12-06

## 2021-06-18 RX ORDER — SCOLOPAMINE TRANSDERMAL SYSTEM 1 MG/1
PATCH, EXTENDED RELEASE TRANSDERMAL AS NEEDED
COMMUNITY
End: 2022-03-25

## 2021-06-18 RX ORDER — INSULIN DETEMIR 100 [IU]/ML
50 INJECTION, SOLUTION SUBCUTANEOUS 2 TIMES DAILY
Qty: 90 ML | Refills: 0 | Status: SHIPPED | OUTPATIENT
Start: 2021-06-18 | End: 2021-11-19

## 2021-06-18 RX ORDER — ISOPROPYL ALCOHOL 0.7 ML/1
SWAB TOPICAL
COMMUNITY

## 2021-06-18 NOTE — PROGRESS NOTES
"     Office Note      Date: 2021  Patient Name: Aida Sigala  MRN: [id number]  : 1955    Chief Complaint   Patient presents with   • Follow-up   • Diabetes       History of Present Illness:   Aida Sigala is a 66 y.o. female who presents today for type 2 diabetes. She remains on basal insulin, Trulicity, Jardiance, and metformin.  Tolerating medications okay.  She is testing blood sugars 3 times per day.  Fasting readings are typically <130.  Afternoon and evening readings (2-3 hours after lunch and dinner) are usually 140-160 range.  She denies any hypoglycemia.  She reports being very busy with work and caring for family members.  She is frequently eating on the go.  She is not eating fast food very often.  Feet - no sores.  Foot exam up to date.  Mild numbness laterally on both feet - stable.  Eye exam - up to date .  She remains on statin, ACE inhibitor, and aspirin.    Subjective      Review of Systems:   Review of Systems   Constitutional: Negative.    Cardiovascular: Negative.    Gastrointestinal: Negative.    Endocrine: Negative.        The following portions of the patient's history were reviewed and updated as appropriate: allergies, current medications, past family history, past medical history, past social history, past surgical history and problem list.    Objective     Vitals:    21 1007   BP: 124/74   Pulse: 57   SpO2: 97%   Weight: 89.3 kg (196 lb 12.8 oz)   Height: 162.6 cm (64\")     Body mass index is 33.78 kg/m².    Physical Exam  Vitals reviewed.   Constitutional:       General: She is not in acute distress.  Feet:      Right foot:      Skin integrity: No ulcer or skin breakdown.      Left foot:      Skin integrity: No ulcer or skin breakdown.   Neurological:      Mental Status: She is alert and oriented to person, place, and time.   Psychiatric:         Mood and Affect: Affect normal.         HEMOGLOBIN A1C  Lab Results   Component Value Date    HGBA1C 7.6 2021    " "HGBA1C 7.9 03/12/2021    HGBA1C 7.8 10/09/2020     GLUCOSE  Lab Results   Component Value Date    POCGLU 118 06/18/2021       Current Outpatient Medications   Medication Instructions   • Alcohol Swabs (Alcohol Wipes) 70 % pads BD Alcohol Swabs   • aspirin 81 mg, Oral, Daily   • Empagliflozin 25 mg, Oral, Daily   • glucose blood test strip OneTouch Verio test strips   Three times a day  E11.8   • Insulin Pen Needle (NovoFine) 32G X 6 MM [facility] Novofine 32 32 gauge x 1/4\" needle   5x /day   • Levemir FlexTouch 50 Units, Subcutaneous, 2 Times Daily   • lisinopril (PRINIVIL,ZESTRIL) 10 MG tablet TAKE 1 TABLET DAILY   • metFORMIN ER (GLUCOPHAGE-XR) 500 MG 24 hr tablet TAKE 2 TABLETS TWICE A DAY   • nadolol (CORGARD) 20 mg, Oral, Daily   • Scopolamine (Transderm-Scop, 1.5 MG,) 1.5 MG/3DAYS patch As Needed   • sertraline (ZOLOFT) 50 MG tablet No dose, route, or frequency recorded.   • simvastatin (ZOCOR) 40 MG tablet TAKE 1 TABLET DAILY   • Synthroid 125 mcg, Oral, Daily   • Trulicity 1.5 mg, Subcutaneous, Weekly   • Ultra Thin Lancets 31G misc Ultra Thin Lancets 31 gauge       Assessment / Plan      Assessment & Plan:  1. Type 2 diabetes mellitus with hyperglycemia, with long-term current use of insulin (CMS/[facility])  A1c above goal, but has improved.  Continue Levemir, Trulicity, Jardiance, metformin.  Consider increasing Trulicity next visit if A1c does not continue to improve.  - POC Glycosylated Hemoglobin (Hb A1C)  - POC Glucose, Blood  - Microalbumin / Creatinine Urine Ratio - Urine, Clean Catch; Future    2. Microalbuminuria  Continue lisinopril.  Check urine protein today.  - Microalbumin / Creatinine Urine Ratio - Urine, Clean Catch; Future      Return in about 3 months (around 9/18/2021) for Next scheduled follow up.     SOUTH Rodriguez  Endocrinology  06/18/2021  "

## 2021-07-22 ENCOUNTER — PRIOR AUTHORIZATION (OUTPATIENT)
Dept: ENDOCRINOLOGY | Facility: CLINIC | Age: 66
End: 2021-07-22

## 2021-07-22 NOTE — TELEPHONE ENCOUNTER
Approvedtoday  Your PA request has been approved. Additional information will be provided in the approval communication. (Message 1145)  Drug  Synthroid 125MCG tablets  Form  [employer] Electronic PA Form (2017 [employer])

## 2021-07-23 RX ORDER — LISINOPRIL 10 MG/1
TABLET ORAL
Qty: 90 TABLET | Refills: 1 | Status: SHIPPED | OUTPATIENT
Start: 2021-07-23 | End: 2022-01-31

## 2021-09-13 RX ORDER — METFORMIN HYDROCHLORIDE 500 MG/1
TABLET, EXTENDED RELEASE ORAL
Qty: 360 TABLET | Refills: 1 | Status: SHIPPED | OUTPATIENT
Start: 2021-09-13 | End: 2022-03-08

## 2021-11-07 DIAGNOSIS — E03.9 ACQUIRED HYPOTHYROIDISM: ICD-10-CM

## 2021-11-08 RX ORDER — LEVOTHYROXINE SODIUM 125 MCG
TABLET ORAL
Qty: 90 TABLET | Refills: 0 | Status: SHIPPED | OUTPATIENT
Start: 2021-11-08 | End: 2022-01-19

## 2021-11-19 DIAGNOSIS — E11.65 TYPE 2 DIABETES MELLITUS WITH HYPERGLYCEMIA, WITH LONG-TERM CURRENT USE OF INSULIN (HCC): Chronic | ICD-10-CM

## 2021-11-19 DIAGNOSIS — Z79.4 TYPE 2 DIABETES MELLITUS WITH HYPERGLYCEMIA, WITH LONG-TERM CURRENT USE OF INSULIN (HCC): Chronic | ICD-10-CM

## 2021-11-19 RX ORDER — INSULIN DETEMIR 100 [IU]/ML
INJECTION, SOLUTION SUBCUTANEOUS
Qty: 90 ML | Refills: 3 | Status: SHIPPED | OUTPATIENT
Start: 2021-11-19 | End: 2022-03-25 | Stop reason: SDUPTHER

## 2021-12-06 ENCOUNTER — OFFICE VISIT (OUTPATIENT)
Dept: ENDOCRINOLOGY | Facility: CLINIC | Age: 66
End: 2021-12-06

## 2021-12-06 VITALS
HEIGHT: 64 IN | SYSTOLIC BLOOD PRESSURE: 123 MMHG | OXYGEN SATURATION: 94 % | HEART RATE: 72 BPM | WEIGHT: 199 LBS | BODY MASS INDEX: 33.97 KG/M2 | DIASTOLIC BLOOD PRESSURE: 74 MMHG

## 2021-12-06 DIAGNOSIS — E11.65 TYPE 2 DIABETES MELLITUS WITH HYPERGLYCEMIA, WITH LONG-TERM CURRENT USE OF INSULIN (HCC): Primary | Chronic | ICD-10-CM

## 2021-12-06 DIAGNOSIS — I10 BENIGN ESSENTIAL HYPERTENSION: Chronic | ICD-10-CM

## 2021-12-06 DIAGNOSIS — Z79.4 TYPE 2 DIABETES MELLITUS WITH HYPERGLYCEMIA, WITH LONG-TERM CURRENT USE OF INSULIN (HCC): Primary | Chronic | ICD-10-CM

## 2021-12-06 DIAGNOSIS — R80.9 MICROALBUMINURIA: ICD-10-CM

## 2021-12-06 DIAGNOSIS — E89.0 POSTOPERATIVE HYPOTHYROIDISM: Chronic | ICD-10-CM

## 2021-12-06 LAB
EXPIRATION DATE: ABNORMAL
EXPIRATION DATE: NORMAL
GLUCOSE BLDC GLUCOMTR-MCNC: 69 MG/DL (ref 70–130)
HBA1C MFR BLD: 8.6 %
Lab: ABNORMAL
Lab: NORMAL

## 2021-12-06 PROCEDURE — 82947 ASSAY GLUCOSE BLOOD QUANT: CPT | Performed by: PHYSICIAN ASSISTANT

## 2021-12-06 PROCEDURE — 83036 HEMOGLOBIN GLYCOSYLATED A1C: CPT | Performed by: PHYSICIAN ASSISTANT

## 2021-12-06 PROCEDURE — 99214 OFFICE O/P EST MOD 30 MIN: CPT | Performed by: PHYSICIAN ASSISTANT

## 2021-12-06 RX ORDER — DULAGLUTIDE 3 MG/.5ML
3 INJECTION, SOLUTION SUBCUTANEOUS WEEKLY
Qty: 6 ML | Refills: 1 | Status: SHIPPED | OUTPATIENT
Start: 2021-12-06 | End: 2022-03-25 | Stop reason: SDUPTHER

## 2021-12-06 RX ORDER — BLOOD SUGAR DIAGNOSTIC
STRIP MISCELLANEOUS
Qty: 300 EACH | Refills: 3 | Status: SHIPPED | OUTPATIENT
Start: 2021-12-06 | End: 2022-10-31 | Stop reason: SDUPTHER

## 2021-12-06 NOTE — PROGRESS NOTES
"     Office Note      Date: 2021  Patient Name: Aida Sigala  MRN: [id number]  : 1955    Chief Complaint   Patient presents with   • Diabetes       History of Present Illness:   Aida Sigala is a 66 y.o. female who presents today for follow up on type 2 diabetes.  She remains on basal insulin, Trulicity, Jardiance, and metformin.  She is tolerating medications well.  She is testing blood sugars 3 times per day.  Fasting readings are typically 130-140 range, 2-3 hours after lunch 160-190, 3-4 hours after dinner ~140.  She denies any hypoglycemia.   She reports that she still very busy with work and caring for her family members.  She reports often eating on the go, but is trying to eat reasonable diet.  Current exercise: none.  Feet: No sores.  Recent foot exam with podiatrist.  Still notes some mild numbness lateral feet.  Eye exam current (within one year): yes, .  Appointment scheduled for 2022.  ACE inhibitor/ARB: Yes, lisinopril.  Statin: Yes, simvastatin.  She has been taking ibuprofen 400 mg about twice per week.    Subjective      Review of Systems:   Review of Systems   Constitutional: Negative.    Cardiovascular: Negative.    Gastrointestinal: Negative.    Endocrine: Negative.        The following portions of the patient's history were reviewed and updated as appropriate: allergies, current medications, past family history, past medical history, past social history, past surgical history and problem list.    Objective     Vitals:    21 1349   BP: 123/74   Pulse: 72   SpO2: 94%   Weight: 90.3 kg (199 lb)   Height: 162.6 cm (64\")     Body mass index is 34.16 kg/m².    Physical Exam  Vitals reviewed.   Constitutional:       General: She is not in acute distress.  Neurological:      Mental Status: She is alert and oriented to person, place, and time.   Psychiatric:         Mood and Affect: Affect normal.         HEMOGLOBIN A1C  Lab Results   Component Value Date    HGBA1C 8.6 " "12/06/2021    HGBA1C 7.6 06/18/2021    HGBA1C 7.9 03/12/2021     GLUCOSE  Lab Results   Component Value Date    POCGLU 69 (A) 12/06/2021     URINE MICROALBUMIN/CREATININE RATIO  Lab Results   Component Value Date    ADENIKE 191.5 06/18/2021       Current Outpatient Medications   Medication Instructions   • Alcohol Swabs (Alcohol Wipes) 70 % pads BD Alcohol Swabs   • aspirin 81 mg, Oral, Daily   • empagliflozin (JARDIANCE) 25 mg, Oral, Daily   • Insulin Pen Needle (NovoFine) 32G X 6 MM [facility] Novofine 32 32 gauge x 1/4\" needle   5x /day   • Levemir FlexTouch 100 UNIT/ML injection INJECT 50 UNITS            SUBCUTANEOUSLY INTO THE    APPROPRIATE AREA AS        DIRECTED TWO TIMES A DAY   • lisinopril (PRINIVIL,ZESTRIL) 10 MG tablet TAKE 1 TABLET DAILY   • metFORMIN ER (GLUCOPHAGE-XR) 500 MG 24 hr tablet TAKE 2 TABLETS TWICE A DAY   • nadolol (CORGARD) 20 mg, Oral, Daily   • OneTouch Verio test strip Testing 3 times per day; E11.65   • Scopolamine (Transderm-Scop, 1.5 MG,) 1.5 MG/3DAYS patch As Needed   • sertraline (ZOLOFT) 50 MG tablet No dose, route, or frequency recorded.   • simvastatin (ZOCOR) 40 MG tablet TAKE 1 TABLET DAILY   • Synthroid 125 MCG tablet TAKE 1 TABLET DAILY   • Trulicity 3 mg, Subcutaneous, Weekly   • Ultra Thin Lancets 31G misc Ultra Thin Lancets 31 gauge       Assessment / Plan      Assessment & Plan:  1. Type 2 diabetes mellitus with hyperglycemia, with long-term current use of insulin (HCC)  A1c increased, above goal.  This is higher than expected based on blood sugar readings.  Encouraged to test 2 hours after eating with goal <180.  Encouraged to keep working on nutritious dietary choices, regular exercise.  Will increase Trulicity to 3 mg weekly.  Continue Levemir, Jardiance, metformin, Trulicity.  - POC Glucose, Blood  - POC Glycosylated Hemoglobin (Hb A1C)    2. Benign essential hypertension  BP well controlled.  Continue lisinopril.    3. Microalbuminuria  Encouraged good blood sugar " control.  BP well controlled.  Encouraged to avoid NSAIDs.  Tylenol okay.  Continue lisinopril.  Check urine protein next visit.    4. Postoperative hypothyroidism   Continue current dose of Synthroid.  She will have labs repeated in February with PCP.    Return in about 3 months (around 3/6/2022) for recheck with A1c, foot exam. She was advised to contact the office with any interval questions or concerns.    SOUTH Rodriguez  Endocrinology  12/06/2021

## 2022-01-19 DIAGNOSIS — IMO0002 DIABETES MELLITUS TYPE 2, UNCONTROLLED, WITH COMPLICATIONS: ICD-10-CM

## 2022-01-19 DIAGNOSIS — E03.9 ACQUIRED HYPOTHYROIDISM: ICD-10-CM

## 2022-01-19 RX ORDER — EMPAGLIFLOZIN 25 MG/1
TABLET, FILM COATED ORAL
Qty: 90 TABLET | Refills: 3 | Status: SHIPPED | OUTPATIENT
Start: 2022-01-19 | End: 2022-03-25 | Stop reason: SDUPTHER

## 2022-01-19 RX ORDER — LEVOTHYROXINE SODIUM 125 MCG
TABLET ORAL
Qty: 90 TABLET | Refills: 3 | Status: SHIPPED | OUTPATIENT
Start: 2022-01-19 | End: 2022-03-25 | Stop reason: SDUPTHER

## 2022-01-31 RX ORDER — LISINOPRIL 10 MG/1
TABLET ORAL
Qty: 90 TABLET | Refills: 1 | Status: SHIPPED | OUTPATIENT
Start: 2022-01-31 | End: 2022-03-25 | Stop reason: SDUPTHER

## 2022-03-08 RX ORDER — METFORMIN HYDROCHLORIDE 500 MG/1
TABLET, EXTENDED RELEASE ORAL
Qty: 360 TABLET | Refills: 1 | Status: SHIPPED | OUTPATIENT
Start: 2022-03-08 | End: 2022-03-25 | Stop reason: SDUPTHER

## 2022-03-14 ENCOUNTER — DOCUMENTATION (OUTPATIENT)
Dept: ENDOCRINOLOGY | Facility: CLINIC | Age: 67
End: 2022-03-14

## 2022-03-14 NOTE — PROGRESS NOTES
Patient declined filling specialty medication at [facility] and/or enrollment in the Patient Management Program.    Patient gets cheaper copays through mail order    Marnie Champagne CPhT  Pharmacy Care Coordinator  3/14/2022  13:08 EDT

## 2022-03-25 ENCOUNTER — LAB (OUTPATIENT)
Dept: LAB | Facility: HOSPITAL | Age: 67
End: 2022-03-25

## 2022-03-25 ENCOUNTER — OFFICE VISIT (OUTPATIENT)
Dept: ENDOCRINOLOGY | Facility: CLINIC | Age: 67
End: 2022-03-25

## 2022-03-25 VITALS
BODY MASS INDEX: 33.8 KG/M2 | HEIGHT: 64 IN | SYSTOLIC BLOOD PRESSURE: 120 MMHG | HEART RATE: 68 BPM | OXYGEN SATURATION: 97 % | WEIGHT: 198 LBS | DIASTOLIC BLOOD PRESSURE: 60 MMHG

## 2022-03-25 DIAGNOSIS — E89.0 POSTOPERATIVE HYPOTHYROIDISM: Chronic | ICD-10-CM

## 2022-03-25 DIAGNOSIS — E11.65 TYPE 2 DIABETES MELLITUS WITH HYPERGLYCEMIA, WITH LONG-TERM CURRENT USE OF INSULIN: Primary | Chronic | ICD-10-CM

## 2022-03-25 DIAGNOSIS — I10 BENIGN ESSENTIAL HYPERTENSION: Chronic | ICD-10-CM

## 2022-03-25 DIAGNOSIS — R80.9 MICROALBUMINURIA: ICD-10-CM

## 2022-03-25 DIAGNOSIS — Z79.4 TYPE 2 DIABETES MELLITUS WITH HYPERGLYCEMIA, WITH LONG-TERM CURRENT USE OF INSULIN: Primary | Chronic | ICD-10-CM

## 2022-03-25 LAB
ALBUMIN UR-MCNC: 16.7 MG/DL
CREAT UR-MCNC: 54.2 MG/DL
EXPIRATION DATE: NORMAL
EXPIRATION DATE: NORMAL
GLUCOSE BLDC GLUCOMTR-MCNC: 123 MG/DL (ref 70–130)
HBA1C MFR BLD: 7.7 %
Lab: NORMAL
Lab: NORMAL
MICROALBUMIN/CREAT UR: 308.1 MG/G

## 2022-03-25 PROCEDURE — 3051F HG A1C>EQUAL 7.0%<8.0%: CPT | Performed by: PHYSICIAN ASSISTANT

## 2022-03-25 PROCEDURE — 99214 OFFICE O/P EST MOD 30 MIN: CPT | Performed by: PHYSICIAN ASSISTANT

## 2022-03-25 PROCEDURE — 83036 HEMOGLOBIN GLYCOSYLATED A1C: CPT | Performed by: PHYSICIAN ASSISTANT

## 2022-03-25 PROCEDURE — 82570 ASSAY OF URINE CREATININE: CPT

## 2022-03-25 PROCEDURE — 82947 ASSAY GLUCOSE BLOOD QUANT: CPT | Performed by: PHYSICIAN ASSISTANT

## 2022-03-25 PROCEDURE — 82043 UR ALBUMIN QUANTITATIVE: CPT

## 2022-03-25 RX ORDER — DULAGLUTIDE 3 MG/.5ML
3 INJECTION, SOLUTION SUBCUTANEOUS WEEKLY
Qty: 6 ML | Refills: 3 | Status: SHIPPED | OUTPATIENT
Start: 2022-03-25 | End: 2023-02-21 | Stop reason: SDUPTHER

## 2022-03-25 RX ORDER — LEVOTHYROXINE SODIUM 0.12 MG/1
125 TABLET ORAL DAILY
Qty: 90 TABLET | Refills: 3 | Status: SHIPPED | OUTPATIENT
Start: 2022-03-25 | End: 2022-08-15 | Stop reason: SDUPTHER

## 2022-03-25 RX ORDER — INSULIN DETEMIR 100 [IU]/ML
50 INJECTION, SOLUTION SUBCUTANEOUS 2 TIMES DAILY
Qty: 90 ML | Refills: 3 | Status: SHIPPED | OUTPATIENT
Start: 2022-03-25 | End: 2022-06-15 | Stop reason: SDUPTHER

## 2022-03-25 RX ORDER — METFORMIN HYDROCHLORIDE 500 MG/1
1000 TABLET, EXTENDED RELEASE ORAL 2 TIMES DAILY
Qty: 360 TABLET | Refills: 3 | Status: SHIPPED | OUTPATIENT
Start: 2022-03-25 | End: 2023-02-21

## 2022-03-25 RX ORDER — LISINOPRIL 10 MG/1
10 TABLET ORAL DAILY
Qty: 90 TABLET | Refills: 3 | Status: SHIPPED | OUTPATIENT
Start: 2022-03-25 | End: 2022-04-04

## 2022-03-25 NOTE — PROGRESS NOTES
"     Office Note      Date: 2022  Patient Name: Aida Sigala  MRN: [id number]  : 1955    Chief Complaint   Patient presents with   • Diabetes       History of Present Illness:   Aida Sigala is a 67 y.o. female who presents today for follow up on type 2 diabetes.  She remains on basal insulin, Trulicity, Jardiance, and metformin.  She is tolerating medications well.  She is testing blood sugars 3 times per day.  Fasting readings often in 120s, 2-3 hours after lunch 160-170, 3-4 hours after dinner 130-150.  She denies any hypoglycemia.  Lowest 88.  Current diet: She reports still eating only go most of the time, but is trying to eat reasonable diet.  Current exercise: None.  Feet: No sores.  Mild numbness lateral feet.  Feet feel cold to her, but not cold to the touch.  Eye exam current (within one year): yes, .  No retinopathy.  Appointment next week.  ACE inhibitor/ARB: Yes, lisinopril.  Statin: Yes, simvastatin.  She did stop taking ibuprofen as recommended after last visit.  She stays very busy.  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  She reports recent colonoscopy.  No polyps.  Next colonoscopy in 5 years.  She had laparoscopic umbilical hernia repair on 2022.  She had labs on 2022.  CMP okay. LDL 72, trigs 236.  CBC okay.      Subjective      Review of Systems:   Review of Systems   Constitutional: Negative.    Cardiovascular: Negative.    Gastrointestinal: Negative.    Endocrine: Negative.        The following portions of the patient's history were reviewed and updated as appropriate: allergies, current medications, past family history, past medical history, past social history, past surgical history and problem list.    Objective     Vitals:    22 0956   BP: 120/60   BP Location: Left arm   Patient Position: Sitting   Cuff Size: Adult   Pulse: 68   SpO2: 97%   Weight: 89.8 kg (198 lb)   Height: 162.6 cm (64\")   PainSc: 0-No " pain     Body mass index is 33.99 kg/m².    Physical Exam  Vitals reviewed.   Constitutional:       General: She is not in acute distress.  Cardiovascular:      Pulses:           Dorsalis pedis pulses are 2+ on the right side and 2+ on the left side.        Posterior tibial pulses are 2+ on the right side and 2+ on the left side.   Musculoskeletal:      Right foot: Deformity (tailor's bunion) present.      Left foot: Deformity (tailor's bunion) present.   Feet:      Right foot:      Protective Sensation: 10 sites tested. 10 sites sensed.      Skin integrity: Callus (mild) present. No ulcer or skin breakdown.      Toenail Condition: Right toenails are normal.      Left foot:      Protective Sensation: 10 sites tested. 10 sites sensed.      Skin integrity: Callus (mild) present. No ulcer or skin breakdown.      Toenail Condition: Left toenails are normal.      Comments: Diabetic Foot Exam Performed and Monofilament Test Performed  Neurological:      Mental Status: She is alert and oriented to person, place, and time.   Psychiatric:         Mood and Affect: Mood and affect normal.         HEMOGLOBIN A1C  Lab Results   Component Value Date    HGBA1C 7.7 03/25/2022    HGBA1C 8.6 12/06/2021    HGBA1C 7.6 06/18/2021     GLUCOSE  Lab Results   Component Value Date    POCGLU 123 03/25/2022     URINE MICROALBUMIN/CREATININE RATIO  Lab Results   Component Value Date    MALBCRERATIO 308.1 03/25/2022       Current Outpatient Medications   Medication Instructions   • Alcohol Swabs (Alcohol Wipes) 70 % pads BD Alcohol Swabs   • aspirin 81 mg, Oral, Daily   • empagliflozin (JARDIANCE) 25 mg, Oral, Daily   • Insulin Pen Needle (NovoFine) 32G X 6 MM misc Use 2 per day   • Levemir FlexTouch 50 Units, Subcutaneous, 2 Times Daily   • levothyroxine (SYNTHROID) 125 mcg, Oral, Daily   • lisinopril (PRINIVIL,ZESTRIL) 10 mg, Oral, Daily   • metFORMIN ER (GLUCOPHAGE-XR) 1,000 mg, Oral, 2 Times Daily   • nadolol (CORGARD) 20 mg, Oral, Daily    • OneTouch Verio test strip Testing 3 times per day; E11.65   • sertraline (ZOLOFT) 50 MG tablet No dose, route, or frequency recorded.   • simvastatin (ZOCOR) 40 MG tablet TAKE 1 TABLET DAILY   • Trulicity 3 mg, Subcutaneous, Weekly   • Ultra Thin Lancets 31G misc Ultra Thin Lancets 31 gauge       Assessment / Plan      Assessment & Plan:  1. Type 2 diabetes mellitus with hyperglycemia, with long-term current use of insulin (HCC)  A1c improved, but remains above goal.  Continue Levemir, Trulicity, Jardiance, Metformin ER.  Encouraged nutritious dietary choices and regular exercise.  Briefly discussed CGM.  She will consider this next visit.  - POC Glucose, Blood  - POC Glycosylated Hemoglobin (Hb A1C)    2. Benign essential hypertension  BP well controlled.  Continue lisinopril.    3. Microalbuminuria  Encouraged good blood sugar control.  Continue to avoid NSAIDs.  Continue lisinopril.  Check urine protein today.  - Microalbumin / Creatinine Urine Ratio - Urine, Clean Catch; Future    4. Postoperative hypothyroidism  Clinically euthyroid.  Continue levothyroxine.      Return in about 3 months (around 6/25/2022) for next scheduled follow up. She was advised to contact the office with any interval questions or concerns.    SOUTH Rodriguez  Endocrinology  03/25/2022

## 2022-04-04 DIAGNOSIS — I10 BENIGN ESSENTIAL HYPERTENSION: Primary | ICD-10-CM

## 2022-04-04 RX ORDER — LISINOPRIL 20 MG/1
20 TABLET ORAL DAILY
Qty: 90 TABLET | Refills: 3 | Status: SHIPPED | OUTPATIENT
Start: 2022-04-04 | End: 2023-02-21 | Stop reason: SDUPTHER

## 2022-06-15 DIAGNOSIS — E11.65 TYPE 2 DIABETES MELLITUS WITH HYPERGLYCEMIA, WITH LONG-TERM CURRENT USE OF INSULIN: Chronic | ICD-10-CM

## 2022-06-15 DIAGNOSIS — Z79.4 TYPE 2 DIABETES MELLITUS WITH HYPERGLYCEMIA, WITH LONG-TERM CURRENT USE OF INSULIN: Chronic | ICD-10-CM

## 2022-06-15 RX ORDER — INSULIN DETEMIR 100 [IU]/ML
50 INJECTION, SOLUTION SUBCUTANEOUS 2 TIMES DAILY
Qty: 90 ML | Refills: 1 | Status: SHIPPED | OUTPATIENT
Start: 2022-06-15 | End: 2022-06-16 | Stop reason: CLARIF

## 2022-06-15 NOTE — TELEPHONE ENCOUNTER
PT CALLED REQUESTING LIZBETHIR PENS TO BE SENT IN TO [facility] ON [facility] IN [place], [place].

## 2022-06-16 ENCOUNTER — TELEPHONE (OUTPATIENT)
Dept: ENDOCRINOLOGY | Facility: CLINIC | Age: 67
End: 2022-06-16

## 2022-06-16 RX ORDER — INSULIN GLARGINE 300 U/ML
50 INJECTION, SOLUTION SUBCUTANEOUS 2 TIMES DAILY
Qty: 30 ML | Refills: 3 | Status: SHIPPED | OUTPATIENT
Start: 2022-06-16 | End: 2022-07-21 | Stop reason: SDUPTHER

## 2022-06-16 NOTE — TELEPHONE ENCOUNTER
PATIENT RETURNED CALL TO [facility]. PATIENT SPOKE WITH INSURANCE AND WAS TOLD THAT TOUJEO IS COVERED AND LANTUS IS COVERED. PATIENT WILL BE ATTENDING A FAMILY MEMBER'S  THIS AFTERNOON AND WILL BE UNABLE TO ACCEPT RETURN CALL BUT STATES THAT IT IS OK TO [facility]

## 2022-06-16 NOTE — TELEPHONE ENCOUNTER
PT CALLED STATING LEVEMIR IS NOT COVERED AND SHE IS UNSURE AS TO WHAT IS COVERED. SHE REQUESTED WE LOOK INTO THIS AND SEND SOMETHING IN AS SHE WILL BE GOING OUT OF TOWN TOMORROW.

## 2022-06-16 NOTE — TELEPHONE ENCOUNTER
Called pt and let her know to contact her insurance to see what they prefer, then give us a call so we can send that in. Pt verbalized understanding and will let us know today.

## 2022-07-11 ENCOUNTER — OFFICE VISIT (OUTPATIENT)
Dept: ENDOCRINOLOGY | Facility: CLINIC | Age: 67
End: 2022-07-11

## 2022-07-11 ENCOUNTER — LAB (OUTPATIENT)
Dept: LAB | Facility: HOSPITAL | Age: 67
End: 2022-07-11

## 2022-07-11 VITALS
HEART RATE: 78 BPM | HEIGHT: 64 IN | SYSTOLIC BLOOD PRESSURE: 114 MMHG | DIASTOLIC BLOOD PRESSURE: 76 MMHG | BODY MASS INDEX: 33.8 KG/M2 | OXYGEN SATURATION: 98 % | WEIGHT: 198 LBS

## 2022-07-11 DIAGNOSIS — I10 BENIGN ESSENTIAL HYPERTENSION: Chronic | ICD-10-CM

## 2022-07-11 DIAGNOSIS — Z79.4 TYPE 2 DIABETES MELLITUS WITH HYPERGLYCEMIA, WITH LONG-TERM CURRENT USE OF INSULIN: Primary | Chronic | ICD-10-CM

## 2022-07-11 DIAGNOSIS — R80.9 MICROALBUMINURIA: ICD-10-CM

## 2022-07-11 DIAGNOSIS — E11.65 TYPE 2 DIABETES MELLITUS WITH HYPERGLYCEMIA, WITH LONG-TERM CURRENT USE OF INSULIN: Primary | Chronic | ICD-10-CM

## 2022-07-11 LAB
EXPIRATION DATE: NORMAL
EXPIRATION DATE: NORMAL
GLUCOSE BLDC GLUCOMTR-MCNC: 116 MG/DL (ref 70–130)
HBA1C MFR BLD: 7.9 %
Lab: NORMAL
Lab: NORMAL

## 2022-07-11 PROCEDURE — 82570 ASSAY OF URINE CREATININE: CPT

## 2022-07-11 PROCEDURE — 3051F HG A1C>EQUAL 7.0%<8.0%: CPT | Performed by: PHYSICIAN ASSISTANT

## 2022-07-11 PROCEDURE — 82947 ASSAY GLUCOSE BLOOD QUANT: CPT | Performed by: PHYSICIAN ASSISTANT

## 2022-07-11 PROCEDURE — 99214 OFFICE O/P EST MOD 30 MIN: CPT | Performed by: PHYSICIAN ASSISTANT

## 2022-07-11 PROCEDURE — 83036 HEMOGLOBIN GLYCOSYLATED A1C: CPT | Performed by: PHYSICIAN ASSISTANT

## 2022-07-11 PROCEDURE — 82043 UR ALBUMIN QUANTITATIVE: CPT

## 2022-07-11 NOTE — PROGRESS NOTES
Office Note      Date: 2022  Patient Name: Aida Sigala  MRN: [id number]  : 1955    Chief Complaint   Patient presents with   • Diabetes     History of Present Illness:   Aida Sigala is a 67 y.o. female who presents today for follow up on type 2 diabetes.  Diabetes was diagnosed in .  Known diabetic complications: nephropathy and peripheral neuropathy.  Current diabetic medications: Basal insulin, Trulicity, Jardiance, and metformin.  Levemir was changed to Toujeo.  She reports developing mild dysuria after being at the beach and pool last month.  She reports drinking cranberry juice and symptoms resolved after 4-5 days.  She is usually testing BG 3 times per day, but once per day in the past couple of weeks.  Fasting readings are usually 115-130, but have been lower recently  range.  BG around 160 2h after lunch, then 140s before dinner.  She denies any hypoglycemia.  She reports that diet has not been good in general, still eating on the go.  Feet: No sores.  Foot exam up to date.  Last eye exam: 3/30/2022, Dr. Erik Curran. No retinopathy.  She had corneal ulcer and then developed infection.  ACE inhibitor/ARB: lisinopril.  Increased lisinopril to 20 mg daily last visit due to increased albuminuria.  Statin: simvastatin.  She will be having labs in August with PCP.  She reports her father-in-law passed away last month.  She has been caring for him for the past 6 years.  She reports that her mom has not been doing well.  [place] [facility] last month.  They are building a new house.     Subjective      Review of Systems   Constitutional: Negative.   Cardiovascular: Negative.    Endocrine: Negative.    Gastrointestinal: Negative.        Past Medical History:   Diagnosis Date   • Allergic    • Elevated cholesterol    • Hyperkalemia    • Hypertension    • Mixed hyperlipidemia    • Obesity    • Postoperative hypothyroidism    • Temporomandibular joint disorder 2016   •  "Type 2 diabetes mellitus (HCC) 2011   • Umbilical hernia 07/05/2018   • Urinary tract infection       Past Surgical History:   Procedure Laterality Date   • THYROIDECTOMY  2003   • CERVICAL FUSION     • WISDOM TOOTH EXTRACTION       The following portions of the patient's history were reviewed and updated as appropriate: allergies, current medications, past family history, past medical history, past social history, past surgical history and problem list.    Objective     Vitals:    07/11/22 1122   BP: 114/76   Pulse: 78   SpO2: 98%   Weight: 89.8 kg (198 lb)   Height: 162.6 cm (64\")   Body mass index is 33.99 kg/m².    Physical Exam  Vitals reviewed.   Constitutional:       General: She is not in acute distress.  Feet:      Right foot:      Skin integrity: No ulcer or skin breakdown.      Left foot:      Skin integrity: No ulcer or skin breakdown.   Neurological:      Mental Status: She is alert and oriented to person, place, and time.   Psychiatric:         Mood and Affect: Affect normal.       HEMOGLOBIN A1C  Lab Results   Component Value Date    HGBA1C 7.9 07/11/2022    HGBA1C 7.7 03/25/2022    HGBA1C 8.6 12/06/2021     GLUCOSE  Lab Results   Component Value Date    POCGLU 116 07/11/2022     URINE MICROALBUMIN/CREATININE RATIO  Lab Results   Component Value Date    MALBCRERATIO 308.1 03/25/2022       Current Outpatient Medications   Medication Instructions   • Alcohol Swabs (Alcohol Wipes) 70 % pads BD Alcohol Swabs   • aspirin 81 mg, Oral, Daily   • empagliflozin (JARDIANCE) 25 mg, Oral, Daily   • Insulin Pen Needle (NovoFine) 32G X 6 MM misc Use 2 per day   • levothyroxine (SYNTHROID) 125 mcg, Oral, Daily   • lisinopril (PRINIVIL,ZESTRIL) 20 mg, Oral, Daily   • metFORMIN ER (GLUCOPHAGE-XR) 1,000 mg, Oral, 2 Times Daily   • nadolol (CORGARD) 20 mg, Oral, Daily   • OneTouch Verio test strip Testing 3 times per day; E11.65   • sertraline (ZOLOFT) 50 MG tablet No dose, route, or frequency recorded.   • simvastatin " (ZOCOR) 40 MG tablet TAKE 1 TABLET DAILY   • Toufarhado Max SoloStar 50 Units, Subcutaneous, 2 Times Daily   • Trulicity 3 mg, Subcutaneous, Weekly   • Ultra Thin Lancets 31G misc Ultra Thin Lancets 31 gauge       Assessment / Plan      Assessment & Plan:  1. Type 2 diabetes mellitus with hyperglycemia, with long-term current use of insulin (HCC)  A1c increased, above goal.  Recent blood sugars improved.  Most readings at goal despite elevated A1c.  Encouraged to check 2-hour postprandials at each meal.  Consider CGM for 2 weeks to help guide treatment.  Encouraged nutritious dietary choices, moderation of carbohydrates, avoidance of added sugar, regular exercise and weight loss.  She reports that she will be having labs with PCP next month.  - POC Glucose, Blood  - POC Glycosylated Hemoglobin (Hb A1C)    2. Benign essential hypertension  BP well controlled.  Continue current treatment.    3. Microalbuminuria  Encouraged good blood sugar control.  BP at goal today.  Lisinopril was increased last visit.  She is avoiding NSAIDs.  - Microalbumin / Creatinine Urine Ratio - Urine, Clean Catch; Future      Return in about 3 months (around 10/11/2022) for next scheduled follow up. She was advised to contact the office with any interval questions or concerns.    SOUTH Rodriguez  Endocrinology  07/11/2022

## 2022-07-12 LAB
ALBUMIN UR-MCNC: 9.1 MG/DL
CREAT UR-MCNC: 30.7 MG/DL
MICROALBUMIN/CREAT UR: 296.4 MG/G

## 2022-07-21 RX ORDER — INSULIN GLARGINE 300 U/ML
50 INJECTION, SOLUTION SUBCUTANEOUS 2 TIMES DAILY
Qty: 30 ML | Refills: 3 | Status: SHIPPED | OUTPATIENT
Start: 2022-07-21

## 2022-08-01 ENCOUNTER — TELEPHONE (OUTPATIENT)
Dept: ENDOCRINOLOGY | Facility: CLINIC | Age: 67
End: 2022-08-01

## 2022-08-01 DIAGNOSIS — N18.1 CHRONIC KIDNEY DISEASE (CKD) STAGE G1/A2, GLOMERULAR FILTRATION RATE (GFR) EQUAL TO OR GREATER THAN 90 ML/MIN/1.73 SQUARE METER AND ALBUMINURIA CREATININE RATIO BETWEEN 30-299 MG/G: ICD-10-CM

## 2022-08-01 DIAGNOSIS — E11.22 TYPE 2 DIABETES MELLITUS WITH CHRONIC KIDNEY DISEASE, WITH LONG-TERM CURRENT USE OF INSULIN, UNSPECIFIED CKD STAGE: Primary | ICD-10-CM

## 2022-08-01 DIAGNOSIS — Z79.4 TYPE 2 DIABETES MELLITUS WITH CHRONIC KIDNEY DISEASE, WITH LONG-TERM CURRENT USE OF INSULIN, UNSPECIFIED CKD STAGE: Primary | ICD-10-CM

## 2022-08-01 NOTE — TELEPHONE ENCOUNTER
Spoke with patient.  The urine protein remains elevated.  This is likely due to uncontrolled diabetes, but would recommend evaluation by nephrology.  She is agreeable to this.

## 2022-08-15 DIAGNOSIS — E89.0 POSTOPERATIVE HYPOTHYROIDISM: Chronic | ICD-10-CM

## 2022-08-15 RX ORDER — LEVOTHYROXINE SODIUM 0.12 MG/1
125 TABLET ORAL DAILY
Qty: 90 TABLET | Refills: 3 | Status: SHIPPED | OUTPATIENT
Start: 2022-08-15

## 2022-10-31 ENCOUNTER — OFFICE VISIT (OUTPATIENT)
Dept: ENDOCRINOLOGY | Facility: CLINIC | Age: 67
End: 2022-10-31
Payer: MEDICARE

## 2022-10-31 ENCOUNTER — LAB (OUTPATIENT)
Dept: LAB | Facility: HOSPITAL | Age: 67
End: 2022-10-31

## 2022-10-31 VITALS
HEIGHT: 64 IN | DIASTOLIC BLOOD PRESSURE: 64 MMHG | BODY MASS INDEX: 33.43 KG/M2 | HEART RATE: 78 BPM | OXYGEN SATURATION: 95 % | WEIGHT: 195.8 LBS | SYSTOLIC BLOOD PRESSURE: 112 MMHG

## 2022-10-31 DIAGNOSIS — Z79.4 TYPE 2 DIABETES MELLITUS WITH HYPERGLYCEMIA, WITH LONG-TERM CURRENT USE OF INSULIN: Primary | Chronic | ICD-10-CM

## 2022-10-31 DIAGNOSIS — N18.1 CHRONIC KIDNEY DISEASE (CKD) STAGE G1/A2, GLOMERULAR FILTRATION RATE (GFR) EQUAL TO OR GREATER THAN 90 ML/MIN/1.73 SQUARE METER AND ALBUMINURIA CREATININE RATIO BETWEEN 30-299 MG/G: Chronic | ICD-10-CM

## 2022-10-31 DIAGNOSIS — E11.65 TYPE 2 DIABETES MELLITUS WITH HYPERGLYCEMIA, WITH LONG-TERM CURRENT USE OF INSULIN: Primary | Chronic | ICD-10-CM

## 2022-10-31 DIAGNOSIS — E89.0 POSTOPERATIVE HYPOTHYROIDISM: Chronic | ICD-10-CM

## 2022-10-31 LAB
EXPIRATION DATE: NORMAL
EXPIRATION DATE: NORMAL
GLUCOSE BLDC GLUCOMTR-MCNC: 91 MG/DL (ref 70–130)
HBA1C MFR BLD: 6.6 %
Lab: NORMAL
Lab: NORMAL
TSH SERPL DL<=0.05 MIU/L-ACNC: 2.23 UIU/ML (ref 0.27–4.2)

## 2022-10-31 PROCEDURE — 3044F HG A1C LEVEL LT 7.0%: CPT | Performed by: PHYSICIAN ASSISTANT

## 2022-10-31 PROCEDURE — 99214 OFFICE O/P EST MOD 30 MIN: CPT | Performed by: PHYSICIAN ASSISTANT

## 2022-10-31 PROCEDURE — 82947 ASSAY GLUCOSE BLOOD QUANT: CPT | Performed by: PHYSICIAN ASSISTANT

## 2022-10-31 PROCEDURE — 83036 HEMOGLOBIN GLYCOSYLATED A1C: CPT | Performed by: PHYSICIAN ASSISTANT

## 2022-10-31 PROCEDURE — 84443 ASSAY THYROID STIM HORMONE: CPT

## 2022-10-31 RX ORDER — BLOOD SUGAR DIAGNOSTIC
STRIP MISCELLANEOUS
Qty: 300 EACH | Refills: 3 | Status: SHIPPED | OUTPATIENT
Start: 2022-10-31 | End: 2022-12-23

## 2022-10-31 RX ORDER — PEN NEEDLE, DIABETIC 32 GX 1/4"
NEEDLE, DISPOSABLE MISCELLANEOUS
Qty: 200 EACH | Refills: 3 | Status: SHIPPED | OUTPATIENT
Start: 2022-10-31

## 2022-10-31 NOTE — PROGRESS NOTES
Office Note      Date: 10/31/2022  Patient Name: Aida Sigala  MRN: [id number]  : 1955    Chief Complaint   Patient presents with   • Diabetes       History of Present Illness  Aida Sigala is a 67 y.o. female who presents today for follow up on type 2 diabetes.   Diabetes was diagnosed in .  Known diabetic complications: nephropathy and peripheral neuropathy.  Current diabetic medications: Toujeo, Trulicity, Jardiance, and metformin.  She reports tolerating medications well.  She reports diarrhea, but only one day.  She is testing FSBG 3 times per day.  Fasting typically <110, then <150 later in the day including 2h after lunch.  She reports occasional hypoglycemia.  She denies any severe hypoglycemia.  Lowest FSBG 61.  Feet: No sores or problems.  Foot exam up to date.  Last eye exam: 2022, Dr. Erik Curran. No retinopathy.  ACE inhibitor/ARB: Lisinopril.  Statin: Simvastatin.  Referred her to nephrology for proteinuria after last visit.  She is scheduled to see Dr. Ordaz in 2023.  She had labs with PCP in 2022.  Potassium was mildly elevated, but normal when repeated.  Creatinine 0.78.  AST was mildly elevated at 45, then decreased to 34 on repeat labs.  Total chol 175, LDL 90, trigs 220, HDL 41.      Review of Systems   Constitutional: Negative.    Cardiovascular: Negative.    Gastrointestinal: Negative.    Endocrine: Negative.      Past Medical History:   Diagnosis Date   • Allergic    • Elevated cholesterol    • Hyperkalemia    • Hypertension    • Mixed hyperlipidemia    • Obesity    • Postoperative hypothyroidism    • Temporomandibular joint disorder 2016   • Type 2 diabetes mellitus (HCC)    • Umbilical hernia 2018   • Urinary tract infection       Past Surgical History:   Procedure Laterality Date   • THYROIDECTOMY     • CERVICAL FUSION     • WISDOM TOOTH EXTRACTION       The following portions of the patient's history were reviewed and updated as  "appropriate: allergies, current medications, past family history, past medical history, past social history, past surgical history and problem list.    Vitals:  /64   Pulse 78   Ht 162.6 cm (64\")   Wt 88.8 kg (195 lb 12.8 oz)   LMP 09/01/2008 Comment: post-menopausal  SpO2 95%   BMI 33.61 kg/m²     Physical Exam  Vitals reviewed.   Constitutional:       General: She is not in acute distress.  Neurological:      Mental Status: She is alert and oriented to person, place, and time.   Psychiatric:         Mood and Affect: Affect normal.       Labs/Imaging    Hemoglobin A1c  Lab Results   Component Value Date    HGBA1C 6.6 10/31/2022    HGBA1C 7.9 07/11/2022    HGBA1C 7.7 03/25/2022     Glucose  Lab Results   Component Value Date    POCGLU 91 10/31/2022     Urine microalbumin/creatinine ratio  Lab Results   Component Value Date    MALBCRERATIO 296.4 07/11/2022       Current Outpatient Medications   Medication Instructions   • Alcohol Swabs (Alcohol Wipes) 70 % pads BD Alcohol Swabs   • aspirin 81 mg, Oral, Daily   • empagliflozin (JARDIANCE) 25 mg, Oral, Daily   • Insulin Pen Needle (Novofine Pen Needle) 32G X 6 MM misc Use 2 per day   • levothyroxine (SYNTHROID) 125 mcg, Oral, Daily   • lisinopril (PRINIVIL,ZESTRIL) 20 mg, Oral, Daily   • metFORMIN ER (GLUCOPHAGE-XR) 1,000 mg, Oral, 2 Times Daily   • nadolol (CORGARD) 20 mg, Oral, Daily   • OneTouch Verio test strip Testing 3 times per day; E11.65   • sertraline (ZOLOFT) 50 MG tablet No dose, route, or frequency recorded.   • simvastatin (ZOCOR) 40 MG tablet TAKE 1 TABLET DAILY   • Toujeo Max SoloStar 50 Units, Subcutaneous, 2 Times Daily   • Trulicity 3 mg, Subcutaneous, Weekly   • Ultra Thin Lancets 31G misc Ultra Thin Lancets 31 gauge       Assessment & Plan  1. Type 2 diabetes mellitus with hyperglycemia, with long-term current use of insulin (HCC)  A1c improved, at goal.  Continue Toujeo, Trulicity, Jardiance, and metformin ER.  Encouraged nutritious " dietary choices and regular exercise.  - POC Glucose, Blood  - POC Glycosylated Hemoglobin (Hb A1C)  - Insulin Pen Needle (Novofine Pen Needle) 32G X 6 MM misc; Use 2 per day  Dispense: 200 each; Refill: 3  - OneTouch Verio test strip; Testing 3 times per day; E11.65  Dispense: 300 each; Refill: 3    2. Postoperative hypothyroidism  Continue levothyroxine 125 mcg daily.  Check TSH today.  - TSH; Future    3. Chronic kidney disease (CKD) stage G1/A2, glomerular filtration rate (GFR) equal to or greater than 90 mL/min/1.73 square meter and albuminuria creatinine ratio between  mg/g  Continue lisinopril 20 mg daily.  Continue good blood sugar and BP control.  Avoiding NSAIDs.  She is scheduled to see nephrology.      Return in about 3 months (around 1/31/2023) for next scheduled follow up. She was advised to contact the office with any interval questions or concerns.    SOUTH Rodriguez  Endocrinology  10/31/2022

## 2022-11-08 ENCOUNTER — TELEPHONE (OUTPATIENT)
Dept: ENDOCRINOLOGY | Facility: CLINIC | Age: 67
End: 2022-11-08

## 2022-11-08 NOTE — TELEPHONE ENCOUNTER
PT. REPORTS SHE THINKS THE METFORMIN IS HURTING HER STOMACH AND IS ASKING IF SHE CAN CUT BACK ON HER DOSE, I ADVISED HER TO CUT BACK 1 PILL AND SEE IF THAT HELPS BUT TO ALSO WATCH HER BLOOD SUGARS AND MAKE SURE THEY DON'T GO UP. PT. WILL LET US KNOW IF THAT HELPS. SAMARA

## 2022-12-23 DIAGNOSIS — E11.65 TYPE 2 DIABETES MELLITUS WITH HYPERGLYCEMIA, WITH LONG-TERM CURRENT USE OF INSULIN: Chronic | ICD-10-CM

## 2022-12-23 DIAGNOSIS — Z79.4 TYPE 2 DIABETES MELLITUS WITH HYPERGLYCEMIA, WITH LONG-TERM CURRENT USE OF INSULIN: Chronic | ICD-10-CM

## 2022-12-23 RX ORDER — BLOOD SUGAR DIAGNOSTIC
STRIP MISCELLANEOUS
Qty: 300 EACH | Refills: 3 | Status: SHIPPED | OUTPATIENT
Start: 2022-12-23

## 2023-02-21 ENCOUNTER — OFFICE VISIT (OUTPATIENT)
Dept: ENDOCRINOLOGY | Facility: CLINIC | Age: 68
End: 2023-02-21
Payer: MEDICARE

## 2023-02-21 VITALS
WEIGHT: 205 LBS | HEART RATE: 60 BPM | DIASTOLIC BLOOD PRESSURE: 58 MMHG | SYSTOLIC BLOOD PRESSURE: 102 MMHG | BODY MASS INDEX: 35 KG/M2 | OXYGEN SATURATION: 97 % | HEIGHT: 64 IN

## 2023-02-21 DIAGNOSIS — E11.65 TYPE 2 DIABETES MELLITUS WITH HYPERGLYCEMIA, WITH LONG-TERM CURRENT USE OF INSULIN: Primary | Chronic | ICD-10-CM

## 2023-02-21 DIAGNOSIS — I10 BENIGN ESSENTIAL HYPERTENSION: Chronic | ICD-10-CM

## 2023-02-21 DIAGNOSIS — Z79.4 TYPE 2 DIABETES MELLITUS WITH HYPERGLYCEMIA, WITH LONG-TERM CURRENT USE OF INSULIN: Primary | Chronic | ICD-10-CM

## 2023-02-21 LAB
EXPIRATION DATE: NORMAL
EXPIRATION DATE: NORMAL
GLUCOSE BLDC GLUCOMTR-MCNC: 101 MG/DL (ref 70–130)
HBA1C MFR BLD: 7.1 %
Lab: NORMAL
Lab: NORMAL

## 2023-02-21 PROCEDURE — 1159F MED LIST DOCD IN RCRD: CPT | Performed by: PHYSICIAN ASSISTANT

## 2023-02-21 PROCEDURE — 1160F RVW MEDS BY RX/DR IN RCRD: CPT | Performed by: PHYSICIAN ASSISTANT

## 2023-02-21 PROCEDURE — 3078F DIAST BP <80 MM HG: CPT | Performed by: PHYSICIAN ASSISTANT

## 2023-02-21 PROCEDURE — 3051F HG A1C>EQUAL 7.0%<8.0%: CPT | Performed by: PHYSICIAN ASSISTANT

## 2023-02-21 PROCEDURE — 99214 OFFICE O/P EST MOD 30 MIN: CPT | Performed by: PHYSICIAN ASSISTANT

## 2023-02-21 PROCEDURE — 82947 ASSAY GLUCOSE BLOOD QUANT: CPT | Performed by: PHYSICIAN ASSISTANT

## 2023-02-21 PROCEDURE — 3074F SYST BP LT 130 MM HG: CPT | Performed by: PHYSICIAN ASSISTANT

## 2023-02-21 PROCEDURE — 83036 HEMOGLOBIN GLYCOSYLATED A1C: CPT | Performed by: PHYSICIAN ASSISTANT

## 2023-02-21 RX ORDER — METFORMIN HYDROCHLORIDE 500 MG/1
TABLET, EXTENDED RELEASE ORAL
Qty: 270 TABLET | Refills: 3 | Status: SHIPPED | OUTPATIENT
Start: 2023-02-21

## 2023-02-21 RX ORDER — DULAGLUTIDE 3 MG/.5ML
3 INJECTION, SOLUTION SUBCUTANEOUS WEEKLY
Qty: 6 ML | Refills: 3 | Status: SHIPPED | OUTPATIENT
Start: 2023-02-21

## 2023-02-21 RX ORDER — LISINOPRIL 20 MG/1
20 TABLET ORAL DAILY
Qty: 90 TABLET | Refills: 3 | Status: SHIPPED | OUTPATIENT
Start: 2023-02-21

## 2023-04-19 DIAGNOSIS — E89.0 POSTOPERATIVE HYPOTHYROIDISM: Chronic | ICD-10-CM

## 2023-04-19 RX ORDER — LEVOTHYROXINE SODIUM 0.12 MG/1
TABLET ORAL
Qty: 90 TABLET | Refills: 0 | Status: SHIPPED | OUTPATIENT
Start: 2023-04-19

## 2023-06-06 DIAGNOSIS — E11.65 TYPE 2 DIABETES MELLITUS WITH HYPERGLYCEMIA, WITH LONG-TERM CURRENT USE OF INSULIN: Chronic | ICD-10-CM

## 2023-06-06 DIAGNOSIS — Z79.4 TYPE 2 DIABETES MELLITUS WITH HYPERGLYCEMIA, WITH LONG-TERM CURRENT USE OF INSULIN: Chronic | ICD-10-CM

## 2023-06-06 RX ORDER — PEN NEEDLE, DIABETIC 32 GX 1/4"
NEEDLE, DISPOSABLE MISCELLANEOUS
Qty: 200 EACH | Refills: 3 | Status: SHIPPED | OUTPATIENT
Start: 2023-06-06

## 2023-06-06 NOTE — TELEPHONE ENCOUNTER
Caller: Aida Sigala    Relationship: Self    Best call back number: [phone]    Requested Prescriptions:   Requested Prescriptions     Pending Prescriptions Disp Refills    Insulin Pen Needle (Novofine Pen Needle) 32G X 6 MM misc 200 each 3     Sig: Use 2 per day        Pharmacy where request should be sent: [facility] [id number] [address] [address] AT [address] - [phone]  - [phone]  [phone]     Last office visit with prescribing clinician: 2/21/2023   Last telemedicine visit with prescribing clinician: Visit date not found   Next office visit with prescribing clinician: 9/5/2023     Additional details provided by patient: PT IS MOVING, LOST NEEDLES, ONLY NEEDS A 1 MONTH SUPPLY     Does the patient have less than a 3 day supply:  [] Yes  [x] No    Would you like a call back once the refill request has been completed: [x] Yes [] No    If the office needs to give you a call back, can they leave a voicemail: [x] Yes [] No    Suly Blancas Rep   06/06/23 09:04 EDT

## 2023-08-17 DIAGNOSIS — E89.0 POSTOPERATIVE HYPOTHYROIDISM: Chronic | ICD-10-CM

## 2023-08-17 RX ORDER — LEVOTHYROXINE SODIUM 0.12 MG/1
TABLET ORAL
Qty: 90 TABLET | Refills: 0 | Status: SHIPPED | OUTPATIENT
Start: 2023-08-17

## 2023-08-17 NOTE — TELEPHONE ENCOUNTER
Rx Refill Note  Requested Prescriptions     Pending Prescriptions Disp Refills    levothyroxine (SYNTHROID, LEVOTHROID) 125 MCG tablet [Pharmacy Med Name: L-THYROXINE (SYNTHROID) TABS 125MCG] 90 tablet 3     Sig: TAKE 1 TABLET DAILY          Last office visit with prescribing clinician: 9/13/23    Next office visit with prescribing clinician2/21/23    Stacie Quinones MA  08/17/23, 11:22 EDT

## 2023-09-13 ENCOUNTER — OFFICE VISIT (OUTPATIENT)
Dept: ENDOCRINOLOGY | Facility: CLINIC | Age: 68
End: 2023-09-13
Payer: MEDICARE

## 2023-09-13 VITALS
DIASTOLIC BLOOD PRESSURE: 80 MMHG | BODY MASS INDEX: 34.56 KG/M2 | WEIGHT: 202.4 LBS | OXYGEN SATURATION: 97 % | HEIGHT: 64 IN | SYSTOLIC BLOOD PRESSURE: 122 MMHG | HEART RATE: 64 BPM

## 2023-09-13 DIAGNOSIS — I10 BENIGN ESSENTIAL HYPERTENSION: Chronic | ICD-10-CM

## 2023-09-13 DIAGNOSIS — E11.65 TYPE 2 DIABETES MELLITUS WITH HYPERGLYCEMIA, WITH LONG-TERM CURRENT USE OF INSULIN: Primary | ICD-10-CM

## 2023-09-13 DIAGNOSIS — E78.2 MIXED HYPERLIPIDEMIA: ICD-10-CM

## 2023-09-13 DIAGNOSIS — Z79.4 TYPE 2 DIABETES MELLITUS WITH HYPERGLYCEMIA, WITH LONG-TERM CURRENT USE OF INSULIN: Primary | ICD-10-CM

## 2023-09-13 PROBLEM — H52.4 PRESBYOPIA: Status: ACTIVE | Noted: 2022-03-29

## 2023-09-13 PROBLEM — Z86.010 HISTORY OF COLONIC POLYPS: Status: ACTIVE | Noted: 2022-08-12

## 2023-09-13 PROBLEM — E11.42 DIABETIC PERIPHERAL NEUROPATHY: Status: ACTIVE | Noted: 2022-08-12

## 2023-09-13 PROBLEM — H52.00 HYPERMETROPIA: Status: ACTIVE | Noted: 2022-03-29

## 2023-09-13 PROBLEM — Z86.0100 HISTORY OF COLONIC POLYPS: Status: ACTIVE | Noted: 2022-08-12

## 2023-09-13 PROBLEM — H25.13 AGE-RELATED NUCLEAR CATARACT OF BOTH EYES: Status: ACTIVE | Noted: 2022-03-29

## 2023-09-13 PROBLEM — F41.9 ANXIETY: Status: ACTIVE | Noted: 2022-08-12

## 2023-09-13 PROBLEM — H52.229 REGULAR ASTIGMATISM: Status: ACTIVE | Noted: 2022-03-29

## 2023-09-13 PROBLEM — H16.009 CORNEAL ULCER: Status: ACTIVE | Noted: 2022-06-02

## 2023-09-13 LAB
ALBUMIN SERPL-MCNC: 4 G/DL (ref 3.5–5.2)
ALBUMIN/GLOB SERPL: 1.3 G/DL
ALP SERPL-CCNC: 91 U/L (ref 39–117)
ALT SERPL W P-5'-P-CCNC: 25 U/L (ref 1–33)
ANION GAP SERPL CALCULATED.3IONS-SCNC: 10.2 MMOL/L (ref 5–15)
AST SERPL-CCNC: 26 U/L (ref 1–32)
BILIRUB SERPL-MCNC: 0.4 MG/DL (ref 0–1.2)
BUN SERPL-MCNC: 12 MG/DL (ref 8–23)
BUN/CREAT SERPL: 16 (ref 7–25)
CALCIUM SPEC-SCNC: 9.8 MG/DL (ref 8.6–10.5)
CHLORIDE SERPL-SCNC: 102 MMOL/L (ref 98–107)
CHOLEST SERPL-MCNC: 177 MG/DL (ref 0–200)
CO2 SERPL-SCNC: 26.8 MMOL/L (ref 22–29)
CREAT SERPL-MCNC: 0.75 MG/DL (ref 0.57–1)
EGFRCR SERPLBLD CKD-EPI 2021: 87.9 ML/MIN/1.73
EXPIRATION DATE: NORMAL
EXPIRATION DATE: NORMAL
GLOBULIN UR ELPH-MCNC: 3.1 GM/DL
GLUCOSE BLDC GLUCOMTR-MCNC: 119 MG/DL (ref 70–130)
GLUCOSE SERPL-MCNC: 105 MG/DL (ref 65–99)
HBA1C MFR BLD: 8.5 %
HDLC SERPL-MCNC: 42 MG/DL (ref 40–60)
LDLC SERPL CALC-MCNC: 100 MG/DL (ref 0–100)
LDLC/HDLC SERPL: 2.23 {RATIO}
Lab: NORMAL
Lab: NORMAL
POTASSIUM SERPL-SCNC: 4.4 MMOL/L (ref 3.5–5.2)
PROT SERPL-MCNC: 7.1 G/DL (ref 6–8.5)
SODIUM SERPL-SCNC: 139 MMOL/L (ref 136–145)
T4 FREE SERPL-MCNC: 1.53 NG/DL (ref 0.93–1.7)
TRIGL SERPL-MCNC: 206 MG/DL (ref 0–150)
TSH SERPL DL<=0.05 MIU/L-ACNC: 0.36 UIU/ML (ref 0.27–4.2)
VLDLC SERPL-MCNC: 35 MG/DL (ref 5–40)

## 2023-09-13 PROCEDURE — 84439 ASSAY OF FREE THYROXINE: CPT | Performed by: INTERNAL MEDICINE

## 2023-09-13 PROCEDURE — 82570 ASSAY OF URINE CREATININE: CPT | Performed by: INTERNAL MEDICINE

## 2023-09-13 PROCEDURE — 82043 UR ALBUMIN QUANTITATIVE: CPT | Performed by: INTERNAL MEDICINE

## 2023-09-13 PROCEDURE — 1160F RVW MEDS BY RX/DR IN RCRD: CPT | Performed by: INTERNAL MEDICINE

## 2023-09-13 PROCEDURE — 1159F MED LIST DOCD IN RCRD: CPT | Performed by: INTERNAL MEDICINE

## 2023-09-13 PROCEDURE — 36415 COLL VENOUS BLD VENIPUNCTURE: CPT | Performed by: INTERNAL MEDICINE

## 2023-09-13 PROCEDURE — 3074F SYST BP LT 130 MM HG: CPT | Performed by: INTERNAL MEDICINE

## 2023-09-13 PROCEDURE — 82947 ASSAY GLUCOSE BLOOD QUANT: CPT | Performed by: INTERNAL MEDICINE

## 2023-09-13 PROCEDURE — 83036 HEMOGLOBIN GLYCOSYLATED A1C: CPT | Performed by: INTERNAL MEDICINE

## 2023-09-13 PROCEDURE — 99214 OFFICE O/P EST MOD 30 MIN: CPT | Performed by: INTERNAL MEDICINE

## 2023-09-13 PROCEDURE — 80053 COMPREHEN METABOLIC PANEL: CPT | Performed by: INTERNAL MEDICINE

## 2023-09-13 PROCEDURE — 3052F HG A1C>EQUAL 8.0%<EQUAL 9.0%: CPT | Performed by: INTERNAL MEDICINE

## 2023-09-13 PROCEDURE — 84443 ASSAY THYROID STIM HORMONE: CPT | Performed by: INTERNAL MEDICINE

## 2023-09-13 PROCEDURE — 80061 LIPID PANEL: CPT | Performed by: INTERNAL MEDICINE

## 2023-09-13 PROCEDURE — 3079F DIAST BP 80-89 MM HG: CPT | Performed by: INTERNAL MEDICINE

## 2023-09-13 RX ORDER — CITALOPRAM HYDROBROMIDE 10 MG/1
10 TABLET ORAL DAILY
COMMUNITY
Start: 2023-08-17

## 2023-09-13 NOTE — PROGRESS NOTES
Aida Sigala 68 y.o.  CC: Diabetes, Hypertension, Hyperlipidemia, and Hypothyroidism      [place]: Diabetes, Hypertension, Hyperlipidemia, and Hypothyroidism    Blood sugar and 90 day average sugar reviewed  Results for orders placed or performed in visit on 09/13/23   POC Glucose, Blood    Specimen: Blood   Result Value Ref Range    Glucose 119 70 - 130 mg/dL    Lot Number [id number]     Expiration Date 3-17-24    POC Glycosylated Hemoglobin (Hb A1C)    Specimen: Blood   Result Value Ref Range    Hemoglobin A1C 8.5 %    Lot Number [id number]     Expiration Date 5-2-25      Average sugar is 195  Is taking basal insulin + GLP-1  Longstanding diabetes - retired principal  Building a home - more recent stress   Stress related rash   Stopped metformin in June - due to diarrhea  Fasting sugar 140-150s    Allergies   Allergen Reactions    Sulfa Antibiotics Hives    Covid-19 Mrna Vacc (Moderna) Rash     Only the Moderna/Pfizer COVID vaccine       Current Outpatient Medications:     aspirin 81 MG EC tablet, Take 1 tablet by mouth Daily., Disp: , Rfl:     citalopram (CeleXA) 10 MG tablet, Take 1 tablet by mouth Daily., Disp: , Rfl:     empagliflozin (Jardiance) 25 MG tablet tablet, Take 1 tablet by mouth Daily., Disp: 90 tablet, Rfl: 3    Insulin Pen Needle (Novofine Pen Needle) 32G X 6 MM misc, Use 2 per day, Disp: 200 each, Rfl: 3    levothyroxine (SYNTHROID, LEVOTHROID) 125 MCG tablet, TAKE 1 TABLET DAILY, Disp: 90 tablet, Rfl: 0    lisinopril (PRINIVIL,ZESTRIL) 20 MG tablet, Take 1 tablet by mouth Daily., Disp: 90 tablet, Rfl: 3    nadolol (CORGARD) 20 MG tablet, Take 1 tablet by mouth Daily., Disp: , Rfl:     OneTouch Verio test strip, USE TO TEST BLOOD SUGAR THREE TIMES DAILY DX CODE E11.65, Disp: 300 each, Rfl: 3    simvastatin (ZOCOR) 40 MG tablet, TAKE 1 TABLET DAILY (Patient taking differently: [facility]ills to [facility]), Disp: 90 tablet, Rfl: 1    Toujeo Max SoloStar 300 UNIT/ML solution pen-injector injection, INJECT  50 UNITS UNDER THE SKIN, INTO THE APPROPRIATE AREA AS DIRECTED TWICE A DAY, Disp: 30 mL, Rfl: 3    Trulicity 3 MG/0.5ML solution pen-injector, Inject 0.5 mL under the skin into the appropriate area as directed 1 (One) Time Per Week., Disp: 6 mL, Rfl: 3    Ultra Thin Lancets 31G misc, Ultra Thin Lancets 31 gauge, Disp: , Rfl:   Patient Active Problem List    Diagnosis     Anxiety [F41.9]     Diabetic peripheral neuropathy [E11.42]     History of colonic polyps [Z86.010]     Corneal ulcer [H16.009]     Age-related nuclear cataract of both eyes [H25.13]     Hypermetropia [H52.00]     Presbyopia [H52.4]     Regular astigmatism [H52.229]     Type 2 diabetes mellitus with hyperglycemia, with long-term current use of insulin [E11.65, Z79.4]     Benign essential hypertension [I10]     Microalbuminuria [R80.9]     Obesity [E66.9]     Mixed hyperlipidemia [E78.2]     Postoperative hypothyroidism [E89.0]      Review of Systems   Constitutional:  Negative for activity change, appetite change and unexpected weight change.   HENT:  Negative for congestion and rhinorrhea.    Eyes:  Negative for visual disturbance.   Respiratory:  Negative for cough and shortness of breath.    Cardiovascular:  Negative for palpitations and leg swelling.   Gastrointestinal:  Negative for constipation, diarrhea and nausea.   Genitourinary:  Negative for hematuria.   Musculoskeletal:  Negative for arthralgias, back pain, gait problem, joint swelling and myalgias.   Skin:  Negative for color change, rash and wound.   Allergic/Immunologic: Negative for environmental allergies, food allergies and immunocompromised state.   Neurological:  Negative for dizziness, weakness and light-headedness.   Psychiatric/Behavioral:  Negative for confusion, decreased concentration, dysphoric mood and sleep disturbance. The patient is not nervous/anxious.         Increased stress   Social History     Socioeconomic History    Marital status:    Tobacco Use     "Smoking status: Never    Smokeless tobacco: Never   Vaping Use    Vaping Use: Never used   Substance and Sexual Activity    Alcohol use: Yes     Alcohol/week: 2.0 standard drinks     Types: 2 Drinks containing 0.5 oz of alcohol per week     Comment: social    Drug use: Never    Sexual activity: Yes     Partners: Male     Birth control/protection: Post-menopausal     Family History   Problem Relation Age of Onset    Heart disease Mother     Thyroid disease Mother     Diabetes Father     Heart disease Father      /80   Pulse 64   Ht 162.6 cm (64\")   Wt 91.8 kg (202 lb 6.4 oz)   LMP 09/01/2008 Comment: post-menopausal  SpO2 97%   BMI 34.74 kg/m²   Physical Exam  Vitals and nursing note reviewed.   Constitutional:       Appearance: Normal appearance. She is well-developed.   HENT:      Head: Normocephalic and atraumatic.   Eyes:      General: Lids are normal.      Extraocular Movements: Extraocular movements intact.      Conjunctiva/sclera: Conjunctivae normal.      Pupils: Pupils are equal, round, and reactive to light.   Neck:      Thyroid: No thyroid mass or thyromegaly.      Vascular: No carotid bruit.      Trachea: Trachea normal. No tracheal deviation.   Cardiovascular:      Rate and Rhythm: Normal rate and regular rhythm.      Pulses: Normal pulses.      Heart sounds: Normal heart sounds. No murmur heard.    No friction rub. No gallop.   Pulmonary:      Effort: Pulmonary effort is normal. No respiratory distress.      Breath sounds: Normal breath sounds. No wheezing.   Musculoskeletal:         General: No deformity. Normal range of motion.      Cervical back: Normal range of motion and neck supple.   Lymphadenopathy:      Cervical: No cervical adenopathy.   Skin:     General: Skin is warm and dry.      Findings: No erythema or rash.      Nails: There is no clubbing.   Neurological:      General: No focal deficit present.      Mental Status: She is alert and oriented to person, place, and time.      " Cranial Nerves: No cranial nerve deficit.      Deep Tendon Reflexes: Reflexes are normal and symmetric. Reflexes normal.   Psychiatric:         Mood and Affect: Mood normal.         Speech: Speech normal.         Behavior: Behavior normal.         Thought Content: Thought content normal.         Judgment: Judgment normal.     Results for orders placed or performed in visit on 09/13/23   POC Glucose, Blood    Specimen: Blood   Result Value Ref Range    Glucose 119 70 - 130 mg/dL    Lot Number [id number]     Expiration Date 3-17-24    POC Glycosylated Hemoglobin (Hb A1C)    Specimen: Blood   Result Value Ref Range    Hemoglobin A1C 8.5 %    Lot Number [id number]     Expiration Date 5-2-25      Diagnoses and all orders for this visit:    1. Type 2 diabetes mellitus with hyperglycemia, with long-term current use of insulin (Primary)  Assessment & Plan:  Blood sugar and 90 day average sugar reviewed  Results for orders placed or performed in visit on 09/13/23   POC Glucose, Blood    Specimen: Blood   Result Value Ref Range    Glucose 119 70 - 130 mg/dL    Lot Number [id number]     Expiration Date 3-17-24    POC Glycosylated Hemoglobin (Hb A1C)    Specimen: Blood   Result Value Ref Range    Hemoglobin A1C 8.5 %    Lot Number [id number]     Expiration Date 5-2-25      Average sugar is high   Uncontrolled diabetes with hyperglycemia discussed  Raise toujeo to goal fasting sugar 100  Sensor jackson 3 placed today   Goal sugar  discussed  Is utd with eye exam  No foot lesion today   Ur alb due and known to be elevated (on ace, jardiance)  Update   F/u 3-4 months or sooner prn     Orders:  -     POC Glucose, Blood  -     POC Glycosylated Hemoglobin (Hb A1C)  -     Comprehensive Metabolic Panel; Future  -     Microalbumin / Creatinine Urine Ratio - Urine, Clean Catch; Future    2. Benign essential hypertension  Assessment & Plan:  BP is well controlled taking zetril, corgard  Continue monitoring       3. Mixed  hyperlipidemia  Assessment & Plan:  Check flp - taking zocor 40 mg daily       Orders:  -     Lipid Panel; Future  -     TSH; Future  -     T4, Free; Future    Return in about 3 months (around 12/13/2023) for Recheck.    Ruth Ann Rodríguez MD  Signed Ruth Ann Rodríguez MD

## 2023-09-13 NOTE — ASSESSMENT & PLAN NOTE
Blood sugar and 90 day average sugar reviewed  Results for orders placed or performed in visit on 09/13/23   POC Glucose, Blood    Specimen: Blood   Result Value Ref Range    Glucose 119 70 - 130 mg/dL    Lot Number [id number]     Expiration Date 3-17-24    POC Glycosylated Hemoglobin (Hb A1C)    Specimen: Blood   Result Value Ref Range    Hemoglobin A1C 8.5 %    Lot Number [id number]     Expiration Date 5-2-25      Average sugar is high   Uncontrolled diabetes with hyperglycemia discussed  Raise toujeo to goal fasting sugar 100  Sensor jackson 3 placed today   Goal sugar  discussed  Is utd with eye exam  No foot lesion today   Ur alb due and known to be elevated (on ace, jardiance)  Update   F/u 3-4 months or sooner prn

## 2023-09-14 LAB
ALBUMIN UR-MCNC: 25 MG/DL
CREAT UR-MCNC: 47.5 MG/DL
MICROALBUMIN/CREAT UR: 526.3 MG/G

## 2023-10-30 DIAGNOSIS — E89.0 POSTOPERATIVE HYPOTHYROIDISM: Chronic | ICD-10-CM

## 2023-10-30 RX ORDER — LEVOTHYROXINE SODIUM 0.12 MG/1
TABLET ORAL
Qty: 90 TABLET | Refills: 1 | Status: SHIPPED | OUTPATIENT
Start: 2023-10-30

## 2023-10-30 NOTE — TELEPHONE ENCOUNTER
Rx Refill Note    Requested Prescriptions     Pending Prescriptions Disp Refills    levothyroxine (SYNTHROID, LEVOTHROID) 125 MCG tablet [Pharmacy Med Name: L-THYROXINE (SYNTHROID) TABS 125MCG] 90 tablet 3     Sig: TAKE 1 TABLET DAILY        Last office visit with prescribing clinician: 9/13/2023     Next office visit with prescribing clinician: 12/21/2023   {

## 2023-12-21 ENCOUNTER — OFFICE VISIT (OUTPATIENT)
Dept: ENDOCRINOLOGY | Facility: CLINIC | Age: 68
End: 2023-12-21
Payer: MEDICARE

## 2023-12-21 VITALS
BODY MASS INDEX: 35.41 KG/M2 | SYSTOLIC BLOOD PRESSURE: 148 MMHG | HEIGHT: 64 IN | OXYGEN SATURATION: 98 % | HEART RATE: 57 BPM | WEIGHT: 207.4 LBS | DIASTOLIC BLOOD PRESSURE: 86 MMHG

## 2023-12-21 DIAGNOSIS — I10 BENIGN ESSENTIAL HYPERTENSION: Chronic | ICD-10-CM

## 2023-12-21 DIAGNOSIS — Z79.4 TYPE 2 DIABETES MELLITUS WITH HYPERGLYCEMIA, WITH LONG-TERM CURRENT USE OF INSULIN: Primary | Chronic | ICD-10-CM

## 2023-12-21 DIAGNOSIS — E11.65 TYPE 2 DIABETES MELLITUS WITH HYPERGLYCEMIA, WITH LONG-TERM CURRENT USE OF INSULIN: Primary | Chronic | ICD-10-CM

## 2023-12-21 LAB
EXPIRATION DATE: ABNORMAL
EXPIRATION DATE: NORMAL
GLUCOSE BLDC GLUCOMTR-MCNC: 101 MG/DL (ref 70–130)
HBA1C MFR BLD: 8 % (ref 4.5–5.7)
Lab: ABNORMAL
Lab: NORMAL

## 2023-12-21 RX ORDER — LISINOPRIL 40 MG/1
40 TABLET ORAL DAILY
Qty: 90 TABLET | Refills: 1 | Status: SHIPPED | OUTPATIENT
Start: 2023-12-21 | End: 2023-12-21 | Stop reason: SDUPTHER

## 2023-12-21 RX ORDER — DULAGLUTIDE 4.5 MG/.5ML
4.5 INJECTION, SOLUTION SUBCUTANEOUS WEEKLY
Qty: 6 ML | Refills: 1 | Status: SHIPPED | OUTPATIENT
Start: 2023-12-21 | End: 2023-12-21 | Stop reason: SDUPTHER

## 2023-12-21 RX ORDER — BLOOD-GLUCOSE SENSOR
1 EACH MISCELLANEOUS
Qty: 6 EACH | Refills: 1 | Status: SHIPPED | OUTPATIENT
Start: 2023-12-21

## 2023-12-21 NOTE — ASSESSMENT & PLAN NOTE
BLood sugar and 90 day average sugar reviewed and discussed  Results for orders placed or performed in visit on 12/21/23   POC Glycosylated Hemoglobin (Hb A1C)    Specimen: Blood   Result Value Ref Range    Hemoglobin A1C 8.0 (A) 4.5 - 5.7 %    Lot Number [id number]     Expiration Date 09/18/2025    POC Glucose, Blood    Specimen: Blood   Result Value Ref Range    Glucose 101 70 - 130 mg/dL    Lot Number [id number]     Expiration Date 06/30/2024      Average sugar is 180  Is utd with eye exam  No foot ulceration- stable neuropathy and discussed adding b complex  Ur alb high with high bp - adjust lisinopril and titration trulicity - target A1c discussed  Uncontrolled diabetes with hyperglycemia reviewed   F/u 3-4 months    Discussed removing kev oil within the next 6 months. Infliximab Counseling:  I discussed with the patient the risks of infliximab including but not limited to myelosuppression, immunosuppression, autoimmune hepatitis, demyelinating diseases, lymphoma, and serious infections.  The patient understands that monitoring is required including a PPD at baseline and must alert us or the primary physician if symptoms of infection or other concerning signs are noted.

## 2023-12-21 NOTE — PROGRESS NOTES
Aida Sigala 68 y.o.  CC:Follow-up, Diabetes (Type II, eye exam March 2023 at [facility] [address]), Hypertension, Hyperlipidemia, and Hypothyroidism      [place]: Follow-up, Diabetes (Type II, eye exam March 2023 at [facility] [address]), Hypertension, Hyperlipidemia, and Hypothyroidism    Blood sugar and 90 day average sugar reviewed  Results for orders placed or performed in visit on 12/21/23   POC Glycosylated Hemoglobin (Hb A1C)    Specimen: Blood   Result Value Ref Range    Hemoglobin A1C 8.0 (A) 4.5 - 5.7 %    Lot Number [id number]     Expiration Date 09/18/2025    POC Glucose, Blood    Specimen: Blood   Result Value Ref Range    Glucose 101 70 - 130 mg/dL    Lot Number [id number]     Expiration Date 06/30/2024    Uncontrolled diabetes with hyperglycemia discussed  Is using trulicity and toujeo, jardiance   Bp reviewed and is higher / normal - taking lisinopril   Is eating low fat diet and taking zocor 40 mg daily   High ur alb - adjustment of lisinopril discussed  Skin on hands is eczematous- using cream without much benefit and any broken skin is slow to heal   Return to sensor discussed and nessa 3 sent to pharmacy - samples provided    Allergies   Allergen Reactions    Sulfa Antibiotics Hives    Covid-19 Mrna Vacc (Moderna) Rash     Only the Moderna/Pfizer COVID vaccine       Current Outpatient Medications:     empagliflozin (Jardiance) 25 MG tablet tablet, Take 1 tablet by mouth Daily., Disp: 90 tablet, Rfl: 3    lisinopril (PRINIVIL,ZESTRIL) 40 MG tablet, Take 1 tablet by mouth Daily., Disp: 90 tablet, Rfl: 1    aspirin 81 MG EC tablet, Take 1 tablet by mouth Daily., Disp: , Rfl:     citalopram (CeleXA) 10 MG tablet, Take 1 tablet by mouth Daily., Disp: , Rfl:     Continuous Blood Gluc Sensor (Curate.UsStyle Nessa 3 Sensor) misc, Use 1 Units Every 14 (Fourteen) Days., Disp: 6 each, Rfl: 1    Dulaglutide (Trulicity) 4.5 MG/0.5ML solution pen-injector, Inject 0.5 mL under the skin into the appropriate  area as directed 1 (One) Time Per Week., Disp: 6 mL, Rfl: 1    Insulin Pen Needle (Novofine Pen Needle) 32G X 6 MM misc, Use 2 per day, Disp: 200 each, Rfl: 3    levothyroxine (SYNTHROID, LEVOTHROID) 125 MCG tablet, TAKE 1 TABLET DAILY, Disp: 90 tablet, Rfl: 1    nadolol (CORGARD) 20 MG tablet, Take 1 tablet by mouth Daily., Disp: , Rfl:     OneTouch Verio test strip, USE TO TEST BLOOD SUGAR THREE TIMES DAILY DX CODE E11.65, Disp: 300 each, Rfl: 3    simvastatin (ZOCOR) 40 MG tablet, TAKE 1 TABLET DAILY (Patient taking differently: Refills to [facility]), Disp: 90 tablet, Rfl: 1    Toujeo Max SoloStar 300 UNIT/ML solution pen-injector injection, INJECT 50 UNITS UNDER THE SKIN, INTO THE APPROPRIATE AREA AS DIRECTED TWICE A DAY, Disp: 30 mL, Rfl: 3    Ultra Thin Lancets 31G misc, Ultra Thin Lancets 31 gauge, Disp: , Rfl:   Patient Active Problem List    Diagnosis     Anxiety [F41.9]     Diabetic peripheral neuropathy [E11.42]     History of colonic polyps [Z86.010]     Corneal ulcer [H16.009]     Age-related nuclear cataract of both eyes [H25.13]     Hypermetropia [H52.00]     Presbyopia [H52.4]     Regular astigmatism [H52.229]     Type 2 diabetes mellitus with hyperglycemia, with long-term current use of insulin [E11.65, Z79.4]     Benign essential hypertension [I10]     Microalbuminuria [R80.9]     Obesity [E66.9]     Mixed hyperlipidemia [E78.2]     Postoperative hypothyroidism [E89.0]      Review of Systems   Constitutional:  Positive for activity change and appetite change. Negative for unexpected weight change.   HENT:  Negative for congestion and rhinorrhea.    Eyes:  Negative for visual disturbance.   Respiratory:  Negative for cough and shortness of breath.    Cardiovascular:  Negative for palpitations and leg swelling.   Gastrointestinal:  Negative for constipation, diarrhea and nausea.   Genitourinary:  Negative for hematuria.   Musculoskeletal:  Positive for arthralgias. Negative for back pain, gait  "problem, joint swelling and myalgias.   Skin:  Negative for color change, rash and wound.   Allergic/Immunologic: Negative for environmental allergies, food allergies and immunocompromised state.   Neurological:  Negative for dizziness, weakness and light-headedness.   Psychiatric/Behavioral:  Negative for confusion, decreased concentration, dysphoric mood and sleep disturbance. The patient is not nervous/anxious.      Social History     Socioeconomic History    Marital status:    Tobacco Use    Smoking status: Never    Smokeless tobacco: Never   Vaping Use    Vaping Use: Never used   Substance and Sexual Activity    Alcohol use: Yes     Alcohol/week: 2.0 standard drinks of alcohol     Types: 2 Drinks containing 0.5 oz of alcohol per week     Comment: social    Drug use: Never    Sexual activity: Yes     Partners: Male     Birth control/protection: Post-menopausal     Family History   Problem Relation Age of Onset    Heart disease Mother     Thyroid disease Mother     Diabetes Father     Heart disease Father      /86   Pulse 57   Ht 162.6 cm (64\")   Wt 94.1 kg (207 lb 6.4 oz)   LMP 09/01/2008 Comment: post-menopausal  SpO2 98%   BMI 35.60 kg/m²   Physical Exam  Vitals and nursing note reviewed.   Constitutional:       Appearance: Normal appearance. She is well-developed.   HENT:      Head: Normocephalic and atraumatic.   Eyes:      General: Lids are normal.      Extraocular Movements: Extraocular movements intact.      Conjunctiva/sclera: Conjunctivae normal.      Pupils: Pupils are equal, round, and reactive to light.   Neck:      Thyroid: No thyroid mass or thyromegaly.      Vascular: No carotid bruit.      Trachea: Trachea normal. No tracheal deviation.   Cardiovascular:      Rate and Rhythm: Normal rate and regular rhythm.      Pulses: Normal pulses.      Heart sounds: Normal heart sounds. No murmur heard.     No friction rub. No gallop.   Pulmonary:      Effort: Pulmonary effort is normal. No " respiratory distress.      Breath sounds: Normal breath sounds. No wheezing.   Musculoskeletal:         General: No deformity. Normal range of motion.      Cervical back: Normal range of motion and neck supple.   Lymphadenopathy:      Cervical: No cervical adenopathy.   Skin:     General: Skin is warm and dry.      Findings: No erythema or rash.      Nails: There is no clubbing.   Neurological:      General: No focal deficit present.      Mental Status: She is alert and oriented to person, place, and time.      Cranial Nerves: No cranial nerve deficit.      Deep Tendon Reflexes: Reflexes are normal and symmetric. Reflexes normal.   Psychiatric:         Mood and Affect: Mood normal.         Speech: Speech normal.         Behavior: Behavior normal.         Thought Content: Thought content normal.         Judgment: Judgment normal.       Results for orders placed or performed in visit on 12/21/23   POC Glycosylated Hemoglobin (Hb A1C)    Specimen: Blood   Result Value Ref Range    Hemoglobin A1C 8.0 (A) 4.5 - 5.7 %    Lot Number [id number]     Expiration Date 09/18/2025    POC Glucose, Blood    Specimen: Blood   Result Value Ref Range    Glucose 101 70 - 130 mg/dL    Lot Number [id number]     Expiration Date 06/30/2024      Diagnoses and all orders for this visit:    1. Type 2 diabetes mellitus with hyperglycemia, with long-term current use of insulin (Primary)  Assessment & Plan:  BLood sugar and 90 day average sugar reviewed and discussed  Results for orders placed or performed in visit on 12/21/23   POC Glycosylated Hemoglobin (Hb A1C)    Specimen: Blood   Result Value Ref Range    Hemoglobin A1C 8.0 (A) 4.5 - 5.7 %    Lot Number [id number]     Expiration Date 09/18/2025    POC Glucose, Blood    Specimen: Blood   Result Value Ref Range    Glucose 101 70 - 130 mg/dL    Lot Number [id number]     Expiration Date 06/30/2024      Average sugar is 180  Is utd with eye exam  No foot ulceration- stable neuropathy and  discussed adding b complex  Ur alb high with high bp - adjust lisinopril and titration trulicity - target A1c discussed  Uncontrolled diabetes with hyperglycemia reviewed   F/u 3-4 months     Orders:  -     POC Glycosylated Hemoglobin (Hb A1C)  -     POC Glucose, Blood  -     empagliflozin (Jardiance) 25 MG tablet tablet; Take 1 tablet by mouth Daily.  Dispense: 90 tablet; Refill: 3    2. Benign essential hypertension  Assessment & Plan:  With high ur alb and normal creatinine- increase lisinopril     Orders:  -     lisinopril (PRINIVIL,ZESTRIL) 40 MG tablet; Take 1 tablet by mouth Daily.  Dispense: 90 tablet; Refill: 1    Other orders  -     Continuous Blood Gluc Sensor (FreeStyle Nessa 3 Sensor) misc; Use 1 Units Every 14 (Fourteen) Days.  Dispense: 6 each; Refill: 1  -     Dulaglutide (Trulicity) 4.5 MG/0.5ML solution pen-injector; Inject 0.5 mL under the skin into the appropriate area as directed 1 (One) Time Per Week.  Dispense: 6 mL; Refill: 1    Return in about 3 months (around 3/21/2024).    Ruth Ann Rodríguez MD  Signed Ruth Ann Rodríguez MD

## 2023-12-23 RX ORDER — DULAGLUTIDE 4.5 MG/.5ML
4.5 INJECTION, SOLUTION SUBCUTANEOUS WEEKLY
Qty: 6 ML | Refills: 1 | Status: SHIPPED | OUTPATIENT
Start: 2023-12-23

## 2023-12-23 RX ORDER — LISINOPRIL 40 MG/1
40 TABLET ORAL DAILY
Qty: 90 TABLET | Refills: 1 | Status: SHIPPED | OUTPATIENT
Start: 2023-12-23

## 2023-12-23 RX ORDER — INSULIN GLARGINE 300 U/ML
INJECTION, SOLUTION SUBCUTANEOUS
Qty: 60 ML | Refills: 1 | Status: SHIPPED | OUTPATIENT
Start: 2023-12-23

## 2024-01-04 ENCOUNTER — TELEPHONE (OUTPATIENT)
Dept: ENDOCRINOLOGY | Facility: CLINIC | Age: 69
End: 2024-01-04
Payer: MEDICARE

## 2024-01-04 NOTE — TELEPHONE ENCOUNTER
Pt was previously taking 50U BID but last rx sent on 12-23-23 was changed to 60U ONCE per day    Please advise on correct dose

## 2024-01-04 NOTE — TELEPHONE ENCOUNTER
Kendra Gutierrez pharmacist with [facility] was advised new dose is 60U once per day  She VU and confirmed the rx

## 2024-01-04 NOTE — TELEPHONE ENCOUNTER
It looks like we changed her dose on 12/23 to 60 u daily  They received a prescription on 12/23 for this dose  I also titrated trulicity at that time and she was supposed to resume her sensors  Thanks, arsh

## 2024-01-04 NOTE — TELEPHONE ENCOUNTER
Manuel with [facility] called needing clarification on Toujeo solostar 300 unit is patient injecting 50 units or 60 units also please clarify directions pt filled  prescription for Toujeo 50 units last month. Please contact Manuel at [phone] Ref # [id number].

## 2024-01-31 DIAGNOSIS — E11.65 TYPE 2 DIABETES MELLITUS WITH HYPERGLYCEMIA, WITH LONG-TERM CURRENT USE OF INSULIN: Chronic | ICD-10-CM

## 2024-01-31 DIAGNOSIS — Z79.4 TYPE 2 DIABETES MELLITUS WITH HYPERGLYCEMIA, WITH LONG-TERM CURRENT USE OF INSULIN: Chronic | ICD-10-CM

## 2024-01-31 RX ORDER — PEN NEEDLE, DIABETIC 32 GX 1/4"
NEEDLE, DISPOSABLE MISCELLANEOUS
Qty: 100 EACH | Refills: 3 | Status: SHIPPED | OUTPATIENT
Start: 2024-01-31

## 2024-01-31 NOTE — TELEPHONE ENCOUNTER
Rx Refill Note  Requested Prescriptions     Pending Prescriptions Disp Refills    Insulin Pen Needle (Novofine Pen Needle) 32G X 6 MM misc 100 each 3     Sig: Use 1 per day      Last office visit with prescribing clinician: 12/21/2023     Next office visit with prescribing clinician: 4/25/2024

## 2024-02-05 DIAGNOSIS — E11.65 TYPE 2 DIABETES MELLITUS WITH HYPERGLYCEMIA, WITH LONG-TERM CURRENT USE OF INSULIN: Chronic | ICD-10-CM

## 2024-02-05 DIAGNOSIS — Z79.4 TYPE 2 DIABETES MELLITUS WITH HYPERGLYCEMIA, WITH LONG-TERM CURRENT USE OF INSULIN: Chronic | ICD-10-CM

## 2024-02-05 RX ORDER — DULAGLUTIDE 4.5 MG/.5ML
4.5 INJECTION, SOLUTION SUBCUTANEOUS WEEKLY
Qty: 6 ML | Refills: 1 | Status: SHIPPED | OUTPATIENT
Start: 2024-02-05

## 2024-02-05 RX ORDER — PEN NEEDLE, DIABETIC 32 GX 1/4"
NEEDLE, DISPOSABLE MISCELLANEOUS
Qty: 100 EACH | Refills: 3 | OUTPATIENT
Start: 2024-02-05

## 2024-02-05 NOTE — TELEPHONE ENCOUNTER
Rx Refill Note    Requested Prescriptions     Pending Prescriptions Disp Refills    Insulin Pen Needle (Novofine Pen Needle) 32G X 6 MM misc 100 each 3     Sig: Use 1 per day    Dulaglutide (Trulicity) 4.5 MG/0.5ML solution pen-injector 6 mL 1     Sig: Inject 0.5 mL under the skin into the appropriate area as directed 1 (One) Time Per Week.        Last office visit with prescribing clinician: 12/21/2023   Next office visit with prescribing clinician: 4/25/2024   {

## 2024-02-09 ENCOUNTER — TELEPHONE (OUTPATIENT)
Dept: ENDOCRINOLOGY | Facility: CLINIC | Age: 69
End: 2024-02-09
Payer: MEDICARE

## 2024-02-09 DIAGNOSIS — Z79.4 TYPE 2 DIABETES MELLITUS WITH HYPERGLYCEMIA, WITH LONG-TERM CURRENT USE OF INSULIN: Chronic | ICD-10-CM

## 2024-02-09 DIAGNOSIS — E11.65 TYPE 2 DIABETES MELLITUS WITH HYPERGLYCEMIA, WITH LONG-TERM CURRENT USE OF INSULIN: Chronic | ICD-10-CM

## 2024-02-09 RX ORDER — DULAGLUTIDE 4.5 MG/.5ML
4.5 INJECTION, SOLUTION SUBCUTANEOUS WEEKLY
Qty: 6 ML | Refills: 1 | Status: CANCELLED | OUTPATIENT
Start: 2024-02-09

## 2024-02-09 RX ORDER — PEN NEEDLE, DIABETIC 32 GX 1/4"
NEEDLE, DISPOSABLE MISCELLANEOUS
Qty: 100 EACH | Refills: 3 | Status: SHIPPED | OUTPATIENT
Start: 2024-02-09

## 2024-02-09 NOTE — TELEPHONE ENCOUNTER
Provider: ARTHUR BAILON    Caller: PIO     Pharmacy: [facility] - [address] - [phone] [facility] [phone]  [phone]     Reason for Call: PIO FROM [facility] CALLED TO GET A PRESCRIPTION FAXED TO THEIR OFFICE FOR THE PT.  Insulin Pen Needle (Novofine Pen Needle) 32G X 6 MM misc     90 DAY SUPPLY

## 2024-02-14 DIAGNOSIS — E11.65 TYPE 2 DIABETES MELLITUS WITH HYPERGLYCEMIA, WITH LONG-TERM CURRENT USE OF INSULIN: Chronic | ICD-10-CM

## 2024-02-14 DIAGNOSIS — Z79.4 TYPE 2 DIABETES MELLITUS WITH HYPERGLYCEMIA, WITH LONG-TERM CURRENT USE OF INSULIN: Chronic | ICD-10-CM

## 2024-02-14 RX ORDER — PEN NEEDLE, DIABETIC 32 GX 1/4"
NEEDLE, DISPOSABLE MISCELLANEOUS
Qty: 100 EACH | Refills: 11 | Status: SHIPPED | OUTPATIENT
Start: 2024-02-14

## 2024-02-14 RX ORDER — DULAGLUTIDE 4.5 MG/.5ML
4.5 INJECTION, SOLUTION SUBCUTANEOUS WEEKLY
Qty: 6 ML | Refills: 1 | OUTPATIENT
Start: 2024-02-14

## 2024-04-29 DIAGNOSIS — E89.0 POSTOPERATIVE HYPOTHYROIDISM: Chronic | ICD-10-CM

## 2024-04-29 RX ORDER — LEVOTHYROXINE SODIUM 0.12 MG/1
TABLET ORAL
Qty: 90 TABLET | Refills: 0 | Status: SHIPPED | OUTPATIENT
Start: 2024-04-29

## 2024-04-29 NOTE — TELEPHONE ENCOUNTER
Rx Refill Note  Requested Prescriptions     Pending Prescriptions Disp Refills    levothyroxine (SYNTHROID, LEVOTHROID) 125 MCG tablet [Pharmacy Med Name: L-THYROXINE (SYNTHROID) TABS 125MCG] 90 tablet 3     Sig: TAKE 1 TABLET DAILY      Last office visit with prescribing clinician: 12/21/2023   Last telemedicine visit with prescribing clinician: Visit date not found   Next office visit with prescribing clinician: 6/3/2024                         Would you like a call back once the refill request has been completed: [] Yes [] No    If the office needs to give you a call back, can they leave a voicemail: [] Yes [] No    Diamond Velasquez MA  04/29/24, 14:24 EDT

## 2024-05-15 NOTE — TELEPHONE ENCOUNTER
PATIENT CALLED REQUESTING RX TOUJEO MAX SOLOSTAR. WAS TOLD BY [facility] THAT THEY SENT MESSAGE BUT WAS UNSURE HOW THAT MESSAGE WAS SENT.     PLEASE ADVISE    PATIENT NUMBER [phone]

## 2024-05-16 RX ORDER — INSULIN GLARGINE 300 U/ML
INJECTION, SOLUTION SUBCUTANEOUS
Qty: 30 ML | Refills: 3 | Status: SHIPPED | OUTPATIENT
Start: 2024-05-16

## 2024-05-16 NOTE — TELEPHONE ENCOUNTER
Rx Refill Note    Requested Prescriptions     Pending Prescriptions Disp Refills    Insulin Glargine, 2 Unit Dial, (Toujeo Max SoloStar) 300 UNIT/ML solution pen-injector injection 15 mL 1     Sig: Inject 60 units once per day        Last office visit with prescribing clinician: 12/21/2023     Next office visit with prescribing clinician: 6/3/2024   {

## 2024-05-30 DIAGNOSIS — I10 BENIGN ESSENTIAL HYPERTENSION: Chronic | ICD-10-CM

## 2024-05-30 RX ORDER — LISINOPRIL 40 MG/1
40 TABLET ORAL DAILY
Qty: 30 TABLET | Refills: 0 | Status: SHIPPED | OUTPATIENT
Start: 2024-05-30

## 2024-06-03 ENCOUNTER — OFFICE VISIT (OUTPATIENT)
Dept: ENDOCRINOLOGY | Facility: CLINIC | Age: 69
End: 2024-06-03
Payer: MEDICARE

## 2024-06-03 VITALS
HEIGHT: 64 IN | SYSTOLIC BLOOD PRESSURE: 102 MMHG | DIASTOLIC BLOOD PRESSURE: 52 MMHG | BODY MASS INDEX: 34.38 KG/M2 | WEIGHT: 201.4 LBS | HEART RATE: 79 BPM | OXYGEN SATURATION: 96 %

## 2024-06-03 DIAGNOSIS — Z79.4 TYPE 2 DIABETES MELLITUS WITH HYPERGLYCEMIA, WITH LONG-TERM CURRENT USE OF INSULIN: Primary | Chronic | ICD-10-CM

## 2024-06-03 DIAGNOSIS — I10 BENIGN ESSENTIAL HYPERTENSION: Chronic | ICD-10-CM

## 2024-06-03 DIAGNOSIS — E78.2 MIXED HYPERLIPIDEMIA: ICD-10-CM

## 2024-06-03 DIAGNOSIS — E11.65 TYPE 2 DIABETES MELLITUS WITH HYPERGLYCEMIA, WITH LONG-TERM CURRENT USE OF INSULIN: Primary | Chronic | ICD-10-CM

## 2024-06-03 PROBLEM — M43.16 SPONDYLOLISTHESIS OF LUMBAR REGION: Status: ACTIVE | Noted: 2024-03-28

## 2024-06-03 LAB
EXPIRATION DATE: ABNORMAL
EXPIRATION DATE: NORMAL
GLUCOSE BLDC GLUCOMTR-MCNC: 122 MG/DL (ref 70–130)
HBA1C MFR BLD: 7.6 % (ref 4.5–5.7)
Lab: ABNORMAL
Lab: NORMAL

## 2024-06-03 PROCEDURE — 84443 ASSAY THYROID STIM HORMONE: CPT | Performed by: INTERNAL MEDICINE

## 2024-06-03 PROCEDURE — 82947 ASSAY GLUCOSE BLOOD QUANT: CPT | Performed by: INTERNAL MEDICINE

## 2024-06-03 PROCEDURE — 1159F MED LIST DOCD IN RCRD: CPT | Performed by: INTERNAL MEDICINE

## 2024-06-03 PROCEDURE — 1160F RVW MEDS BY RX/DR IN RCRD: CPT | Performed by: INTERNAL MEDICINE

## 2024-06-03 PROCEDURE — 3074F SYST BP LT 130 MM HG: CPT | Performed by: INTERNAL MEDICINE

## 2024-06-03 PROCEDURE — 3051F HG A1C>EQUAL 7.0%<8.0%: CPT | Performed by: INTERNAL MEDICINE

## 2024-06-03 PROCEDURE — 99214 OFFICE O/P EST MOD 30 MIN: CPT | Performed by: INTERNAL MEDICINE

## 2024-06-03 PROCEDURE — 82570 ASSAY OF URINE CREATININE: CPT | Performed by: INTERNAL MEDICINE

## 2024-06-03 PROCEDURE — 3078F DIAST BP <80 MM HG: CPT | Performed by: INTERNAL MEDICINE

## 2024-06-03 PROCEDURE — 82043 UR ALBUMIN QUANTITATIVE: CPT | Performed by: INTERNAL MEDICINE

## 2024-06-03 PROCEDURE — 84439 ASSAY OF FREE THYROXINE: CPT | Performed by: INTERNAL MEDICINE

## 2024-06-03 PROCEDURE — 80053 COMPREHEN METABOLIC PANEL: CPT | Performed by: INTERNAL MEDICINE

## 2024-06-03 PROCEDURE — 80061 LIPID PANEL: CPT | Performed by: INTERNAL MEDICINE

## 2024-06-03 PROCEDURE — 83036 HEMOGLOBIN GLYCOSYLATED A1C: CPT | Performed by: INTERNAL MEDICINE

## 2024-06-03 PROCEDURE — 36415 COLL VENOUS BLD VENIPUNCTURE: CPT | Performed by: INTERNAL MEDICINE

## 2024-06-03 NOTE — ASSESSMENT & PLAN NOTE
Blood sugar and 90 day average sugar reviewed  Results for orders placed or performed in visit on 06/03/24   POC Glycosylated Hemoglobin (Hb A1C)    Specimen: Blood   Result Value Ref Range    Hemoglobin A1C 7.6 (A) 4.5 - 5.7 %    Lot Number [id number]     Expiration Date 02/12/2026    POC Glucose, Blood    Specimen: Blood   Result Value Ref Range    Glucose 122 70 - 130 mg/dL    Lot Number [id number]     Expiration Date 11/22/2024      Average sugar is 165  Is utd with eye exam  No foot callus or ulcer  Ur alb neg  F/u 3-4 months

## 2024-06-03 NOTE — PROGRESS NOTES
Aida Sigala 69 y.o.  CC:Follow-up, Diabetes (Type II, eye exam 5-30-24), Hypertension, Hyperlipidemia, and Hypothyroidism    [place]: Follow-up, Diabetes (Type II, eye exam 5-30-24), Hypertension, Hyperlipidemia, and Hypothyroidism    Blood sugar and 90 day average sugar reviewed  Results for orders placed or performed in visit on 06/03/24   POC Glycosylated Hemoglobin (Hb A1C)    Specimen: Blood   Result Value Ref Range    Hemoglobin A1C 7.6 (A) 4.5 - 5.7 %    Lot Number [id number]     Expiration Date 02/12/2026    POC Glucose, Blood    Specimen: Blood   Result Value Ref Range    Glucose 122 70 - 130 mg/dL    Lot Number [id number]     Expiration Date 11/22/2024      Average sugar is 165  BP is good overall- weight down about 6 lbs  Is eating low fat diet and taking zocor 40 mg daily   Is taking levothyroxine 125 mcg daily   Had spine surgery in 3/24 - [facility]- blood sugars reviewed  Has had some fatigue over past few days  Has had steroid shots in knee and back     Allergies   Allergen Reactions    Sulfa Antibiotics Hives    Covid-19 Mrna Vacc (Moderna) Rash     Only the Moderna/Pfizer COVID vaccine       Current Outpatient Medications:     aspirin 81 MG EC tablet, Take 1 tablet by mouth Daily., Disp: , Rfl:     citalopram (CeleXA) 10 MG tablet, Take 1 tablet by mouth Daily., Disp: , Rfl:     Continuous Blood Gluc Sensor (FreeStyle Nessa 3 Sensor) misc, Use 1 Units Every 14 (Fourteen) Days., Disp: 6 each, Rfl: 1    Dulaglutide (Trulicity) 4.5 MG/0.5ML solution pen-injector, Inject 0.5 mL under the skin into the appropriate area as directed 1 (One) Time Per Week., Disp: 6 mL, Rfl: 1    empagliflozin (Jardiance) 25 MG tablet tablet, Take 1 tablet by mouth Daily., Disp: 90 tablet, Rfl: 1    Insulin Glargine, 2 Unit Dial, (Toujeo Max SoloStar) 300 UNIT/ML solution pen-injector injection, Inject 60 units once per day (Patient taking differently: Inject 60 units BID), Disp: 30 mL, Rfl: 3    Insulin Pen Needle (BD Pen Needle  Micro U/F) 32G X 6 MM misc, Use daily with insulin pens., Disp: 100 each, Rfl: 11    Insulin Pen Needle (Novofine Pen Needle) 32G X 6 MM misc, Use 1 per day, Disp: 100 each, Rfl: 3    levothyroxine (SYNTHROID, LEVOTHROID) 125 MCG tablet, TAKE 1 TABLET DAILY, Disp: 90 tablet, Rfl: 0    lisinopril (PRINIVIL,ZESTRIL) 40 MG tablet, TAKE 1 TABLET DAILY, Disp: 30 tablet, Rfl: 0    nadolol (CORGARD) 20 MG tablet, Take 1 tablet by mouth Daily., Disp: , Rfl:     OneTouch Verio test strip, USE TO TEST BLOOD SUGAR THREE TIMES DAILY DX CODE E11.65, Disp: 300 each, Rfl: 3    simvastatin (ZOCOR) 40 MG tablet, TAKE 1 TABLET DAILY (Patient taking differently: Refills to EXPRESS SCRIPT), Disp: 90 tablet, Rfl: 1    Ultra Thin Lancets 31G misc, Ultra Thin Lancets 31 gauge, Disp: , Rfl:     Patient Active Problem List    Diagnosis     Spondylolisthesis of lumbar region [M43.16]     Anxiety [F41.9]     Diabetic peripheral neuropathy [E11.42]     History of colonic polyps [Z86.010]     Corneal ulcer [H16.009]     Age-related nuclear cataract of both eyes [H25.13]     Hypermetropia [H52.00]     Presbyopia [H52.4]     Regular astigmatism [H52.229]     Type 2 diabetes mellitus with hyperglycemia, with long-term current use of insulin [E11.65, Z79.4]     Benign essential hypertension [I10]     Microalbuminuria [R80.9]     Obesity [E66.9]     Mixed hyperlipidemia [E78.2]     Postoperative hypothyroidism [E89.0]      Review of Systems   Constitutional:  Positive for activity change. Negative for appetite change and unexpected weight change.   HENT:  Negative for congestion and rhinorrhea.    Eyes:  Negative for visual disturbance.   Respiratory:  Negative for cough and shortness of breath.    Cardiovascular:  Negative for palpitations and leg swelling.   Gastrointestinal:  Negative for constipation, diarrhea and nausea.   Genitourinary:  Negative for hematuria.   Musculoskeletal:  Positive for gait problem. Negative for arthralgias, back pain,  "joint swelling and myalgias.   Skin:  Negative for color change, rash and wound.   Allergic/Immunologic: Negative for environmental allergies, food allergies and immunocompromised state.   Neurological:  Positive for weakness. Negative for dizziness and light-headedness.   Psychiatric/Behavioral:  Negative for confusion, decreased concentration, dysphoric mood and sleep disturbance. The patient is not nervous/anxious.      Social History     Socioeconomic History    Marital status:    Tobacco Use    Smoking status: Never    Smokeless tobacco: Never   Vaping Use    Vaping status: Never Used   Substance and Sexual Activity    Alcohol use: Yes     Alcohol/week: 2.0 standard drinks of alcohol     Types: 2 Drinks containing 0.5 oz of alcohol per week     Comment: social    Drug use: Never    Sexual activity: Yes     Partners: Male     Birth control/protection: Post-menopausal     Family History   Problem Relation Age of Onset    Heart disease Mother     Thyroid disease Mother     Diabetes Father     Heart disease Father      /52   Pulse 79   Ht 162.6 cm (64\")   Wt 91.4 kg (201 lb 6.4 oz)   LMP 09/01/2008 Comment: post-menopausal  SpO2 96%   BMI 34.57 kg/m²     Physical Exam  Vitals and nursing note reviewed.   Constitutional:       Appearance: Normal appearance. She is well-developed.   HENT:      Head: Normocephalic and atraumatic.   Eyes:      General: Lids are normal.      Extraocular Movements: Extraocular movements intact.      Conjunctiva/sclera: Conjunctivae normal.      Pupils: Pupils are equal, round, and reactive to light.   Neck:      Thyroid: No thyroid mass or thyromegaly.      Vascular: No carotid bruit.      Trachea: Trachea normal. No tracheal deviation.   Cardiovascular:      Rate and Rhythm: Normal rate and regular rhythm.      Pulses: Normal pulses.      Heart sounds: Normal heart sounds. No murmur heard.     No friction rub. No gallop.   Pulmonary:      Effort: Pulmonary effort is " normal. No respiratory distress.      Breath sounds: Normal breath sounds. No wheezing.   Musculoskeletal:         General: No deformity. Normal range of motion.      Cervical back: Normal range of motion and neck supple.   Lymphadenopathy:      Cervical: No cervical adenopathy.   Skin:     General: Skin is warm and dry.      Findings: No erythema or rash.      Nails: There is no clubbing.   Neurological:      General: No focal deficit present.      Mental Status: She is alert and oriented to person, place, and time.      Cranial Nerves: No cranial nerve deficit.      Deep Tendon Reflexes: Reflexes are normal and symmetric. Reflexes normal.   Psychiatric:         Mood and Affect: Mood normal.         Speech: Speech normal.         Behavior: Behavior normal.         Thought Content: Thought content normal.         Judgment: Judgment normal.       Results for orders placed or performed in visit on 06/03/24   POC Glycosylated Hemoglobin (Hb A1C)    Specimen: Blood   Result Value Ref Range    Hemoglobin A1C 7.6 (A) 4.5 - 5.7 %    Lot Number [id number]     Expiration Date 02/12/2026    POC Glucose, Blood    Specimen: Blood   Result Value Ref Range    Glucose 122 70 - 130 mg/dL    Lot Number [id number]     Expiration Date 11/22/2024      Diagnoses and all orders for this visit:    1. Type 2 diabetes mellitus with hyperglycemia, with long-term current use of insulin (Primary)  Assessment & Plan:  Blood sugar and 90 day average sugar reviewed  Results for orders placed or performed in visit on 06/03/24   POC Glycosylated Hemoglobin (Hb A1C)    Specimen: Blood   Result Value Ref Range    Hemoglobin A1C 7.6 (A) 4.5 - 5.7 %    Lot Number [id number]     Expiration Date 02/12/2026    POC Glucose, Blood    Specimen: Blood   Result Value Ref Range    Glucose 122 70 - 130 mg/dL    Lot Number [id number]     Expiration Date 11/22/2024      Average sugar is 165  Is utd with eye exam  No foot callus or ulcer  Ur alb neg  F/u 3-4 months      Orders:  -     POC Glycosylated Hemoglobin (Hb A1C)  -     POC Glucose, Blood  -     Comprehensive Metabolic Panel; Future  -     Microalbumin / Creatinine Urine Ratio - Urine, Clean Catch; Future  -     Comprehensive Metabolic Panel  -     Microalbumin / Creatinine Urine Ratio - Urine, Clean Catch    2. Mixed hyperlipidemia  Assessment & Plan:  Is eating a low fat diet and taking zocor 40 mg daily   Check flp     Orders:  -     Lipid Panel; Future  -     TSH; Future  -     T4, Free; Future  -     Lipid Panel  -     TSH  -     T4, Free    3. Benign essential hypertension  Assessment & Plan:  BP is low, pt c/o fatigue  Will discuss with Dr Ruth- in meantime monitor and reduce lisinopril to 20 mg daily      Return in about 3 months (around 9/3/2024).    Electronically signed by: Ruth Ann Rodríguez MD

## 2024-06-03 NOTE — ASSESSMENT & PLAN NOTE
BP is low, pt c/o fatigue  Will discuss with Dr Ruth- in meantime monitor and reduce lisinopril to 20 mg daily

## 2024-06-04 LAB
ALBUMIN SERPL-MCNC: 4 G/DL (ref 3.5–5.2)
ALBUMIN UR-MCNC: 13.6 MG/DL
ALBUMIN/GLOB SERPL: 1.3 G/DL
ALP SERPL-CCNC: 97 U/L (ref 39–117)
ALT SERPL W P-5'-P-CCNC: 23 U/L (ref 1–33)
ANION GAP SERPL CALCULATED.3IONS-SCNC: 9 MMOL/L (ref 5–15)
AST SERPL-CCNC: 18 U/L (ref 1–32)
BILIRUB SERPL-MCNC: 0.3 MG/DL (ref 0–1.2)
BUN SERPL-MCNC: 16 MG/DL (ref 8–23)
BUN/CREAT SERPL: 18.2 (ref 7–25)
CALCIUM SPEC-SCNC: 9.8 MG/DL (ref 8.6–10.5)
CHLORIDE SERPL-SCNC: 100 MMOL/L (ref 98–107)
CHOLEST SERPL-MCNC: 209 MG/DL (ref 0–200)
CO2 SERPL-SCNC: 29 MMOL/L (ref 22–29)
CREAT SERPL-MCNC: 0.88 MG/DL (ref 0.57–1)
CREAT UR-MCNC: 36.4 MG/DL
EGFRCR SERPLBLD CKD-EPI 2021: 72.1 ML/MIN/1.73
GLOBULIN UR ELPH-MCNC: 3 GM/DL
GLUCOSE SERPL-MCNC: 119 MG/DL (ref 65–99)
HDLC SERPL-MCNC: 42 MG/DL (ref 40–60)
LDLC SERPL CALC-MCNC: 115 MG/DL (ref 0–100)
LDLC/HDLC SERPL: 2.54 {RATIO}
MICROALBUMIN/CREAT UR: 373.6 MG/G (ref 0–29)
POTASSIUM SERPL-SCNC: 4.8 MMOL/L (ref 3.5–5.2)
PROT SERPL-MCNC: 7 G/DL (ref 6–8.5)
SODIUM SERPL-SCNC: 138 MMOL/L (ref 136–145)
T4 FREE SERPL-MCNC: 1.65 NG/DL (ref 0.92–1.68)
TRIGL SERPL-MCNC: 302 MG/DL (ref 0–150)
TSH SERPL DL<=0.05 MIU/L-ACNC: 0.35 UIU/ML (ref 0.27–4.2)
VLDLC SERPL-MCNC: 52 MG/DL (ref 5–40)

## 2024-07-19 RX ORDER — INSULIN GLARGINE 300 U/ML
INJECTION, SOLUTION SUBCUTANEOUS
Qty: 36 ML | Refills: 1 | OUTPATIENT
Start: 2024-07-19

## 2024-07-19 NOTE — TELEPHONE ENCOUNTER
Rx Refill Note  Requested Prescriptions      No prescriptions requested or ordered in this encounter        Last office visit with prescribing clinician: 6/3/2024      Next office visit with prescribing clinician: 10/3/2024       Payton Nugent (Jodi)  07/19/24, 15:56 EDT     Pt called needing toujeo directions changed to 60 units bid-that is how she has been taking it.  Ok to change and call in new rx per dr leon.  Pt only has a few days of insulin left -advised I will put 2 pens for sample in the frig.  She will pick them up monday

## 2024-07-26 DIAGNOSIS — E89.0 POSTOPERATIVE HYPOTHYROIDISM: Chronic | ICD-10-CM

## 2024-07-26 RX ORDER — LEVOTHYROXINE SODIUM 0.12 MG/1
TABLET ORAL
Qty: 90 TABLET | Refills: 0 | Status: SHIPPED | OUTPATIENT
Start: 2024-07-26

## 2024-07-26 NOTE — TELEPHONE ENCOUNTER
Rx Refill Note  Requested Prescriptions     Pending Prescriptions Disp Refills    levothyroxine (SYNTHROID, LEVOTHROID) 125 MCG tablet [Pharmacy Med Name: L-THYROXINE (SYNTHROID) TABS 125MCG] 90 tablet 3     Sig: TAKE 1 TABLET DAILY      Last office visit with prescribing clinician: 6/3/2024     Next office visit with prescribing clinician: 10/3/2024     Clementina Kennedy MA  07/26/24, 10:41 EDT

## 2024-08-16 DIAGNOSIS — Z79.4 TYPE 2 DIABETES MELLITUS WITH HYPERGLYCEMIA, WITH LONG-TERM CURRENT USE OF INSULIN: Chronic | ICD-10-CM

## 2024-08-16 DIAGNOSIS — E11.65 TYPE 2 DIABETES MELLITUS WITH HYPERGLYCEMIA, WITH LONG-TERM CURRENT USE OF INSULIN: Chronic | ICD-10-CM

## 2024-08-16 RX ORDER — EMPAGLIFLOZIN 25 MG/1
25 TABLET, FILM COATED ORAL DAILY
Qty: 90 TABLET | Refills: 3 | Status: SHIPPED | OUTPATIENT
Start: 2024-08-16

## 2024-08-16 NOTE — TELEPHONE ENCOUNTER
Rx Refill Note  Requested Prescriptions     Pending Prescriptions Disp Refills    Jardiance 25 MG tablet tablet [Pharmacy Med Name: JARDIANCE TABS 25MG] 90 tablet 3     Sig: TAKE 1 TABLET DAILY      Last office visit with prescribing clinician: 6/3/2024   Next office visit with prescribing clinician: 10/3/2024                           Treasure Gale MA  08/16/24, 08:51 EDT

## 2024-09-23 RX ORDER — DULAGLUTIDE 4.5 MG/.5ML
INJECTION, SOLUTION SUBCUTANEOUS
Qty: 6 ML | Refills: 0 | Status: SHIPPED | OUTPATIENT
Start: 2024-09-23

## 2024-10-08 ENCOUNTER — OFFICE VISIT (OUTPATIENT)
Dept: ENDOCRINOLOGY | Facility: CLINIC | Age: 69
End: 2024-10-08
Payer: MEDICARE

## 2024-10-08 VITALS
BODY MASS INDEX: 33.94 KG/M2 | SYSTOLIC BLOOD PRESSURE: 108 MMHG | HEIGHT: 64 IN | DIASTOLIC BLOOD PRESSURE: 62 MMHG | WEIGHT: 198.8 LBS | HEART RATE: 73 BPM | OXYGEN SATURATION: 94 %

## 2024-10-08 DIAGNOSIS — Z79.4 TYPE 2 DIABETES MELLITUS WITH HYPERGLYCEMIA, WITH LONG-TERM CURRENT USE OF INSULIN: Primary | ICD-10-CM

## 2024-10-08 DIAGNOSIS — E11.65 TYPE 2 DIABETES MELLITUS WITH HYPERGLYCEMIA, WITH LONG-TERM CURRENT USE OF INSULIN: Primary | ICD-10-CM

## 2024-10-08 DIAGNOSIS — I10 BENIGN ESSENTIAL HYPERTENSION: Chronic | ICD-10-CM

## 2024-10-08 LAB
EXPIRATION DATE: ABNORMAL
EXPIRATION DATE: NORMAL
GLUCOSE BLDC GLUCOMTR-MCNC: 130 MG/DL (ref 70–130)
HBA1C MFR BLD: 8.1 % (ref 4.5–5.7)
Lab: ABNORMAL
Lab: NORMAL

## 2024-10-08 PROCEDURE — 83036 HEMOGLOBIN GLYCOSYLATED A1C: CPT | Performed by: INTERNAL MEDICINE

## 2024-10-08 PROCEDURE — 3074F SYST BP LT 130 MM HG: CPT | Performed by: INTERNAL MEDICINE

## 2024-10-08 PROCEDURE — 1159F MED LIST DOCD IN RCRD: CPT | Performed by: INTERNAL MEDICINE

## 2024-10-08 PROCEDURE — 3052F HG A1C>EQUAL 8.0%<EQUAL 9.0%: CPT | Performed by: INTERNAL MEDICINE

## 2024-10-08 PROCEDURE — 82947 ASSAY GLUCOSE BLOOD QUANT: CPT | Performed by: INTERNAL MEDICINE

## 2024-10-08 PROCEDURE — 99214 OFFICE O/P EST MOD 30 MIN: CPT | Performed by: INTERNAL MEDICINE

## 2024-10-08 PROCEDURE — 1160F RVW MEDS BY RX/DR IN RCRD: CPT | Performed by: INTERNAL MEDICINE

## 2024-10-08 PROCEDURE — 3078F DIAST BP <80 MM HG: CPT | Performed by: INTERNAL MEDICINE

## 2024-10-08 NOTE — PROGRESS NOTES
Aida Sigala 69 y.o.  CC: follow up II Diabetes, hypothyroid, hypertension    [place]: follow up II diabetes, hypothyroid, hypertension  Blood sugar and 90 day average sugar reviewed  Results for orders placed or performed in visit on 10/08/24   POC Glucose, Blood    Specimen: Blood   Result Value Ref Range    Glucose 130 70 - 130 mg/dL    Lot Number [id number]     Expiration Date 5/24/2025    POC Glycosylated Hemoglobin (Hb A1C)    Specimen: Blood   Result Value Ref Range    Hemoglobin A1C 8.1 (A) 4.5 - 5.7 %    Lot Number [id number]     Expiration Date 6/20/2026      Taking care of her mother - stroke in July   Bp is good today   Is eating low fat diet  Is taking synthroid 125 mcg daily   Is taking toujeo 60 u bid , trulicity   Is taking jardiance 25 mg daily   No low sugars  Fasting sugars 90 -124    Allergies   Allergen Reactions    Sulfa Antibiotics Hives    Covid-19 Mrna Vacc (Moderna) Rash     Only the Moderna/Pfizer COVID vaccine       Current Outpatient Medications:     aspirin 81 MG EC tablet, Take 1 tablet by mouth Daily., Disp: , Rfl:     citalopram (CeleXA) 10 MG tablet, Take 1 tablet by mouth Daily., Disp: , Rfl:     Dulaglutide (Trulicity) 4.5 MG/0.5ML solution pen-injector, INJECT 0.5 ML UNDER THE SKIN INTO THE APPROPRIATE AREA AS DIRECTED ONCE A WEEK, Disp: 6 mL, Rfl: 0    Insulin Glargine, 2 Unit Dial, (Toujeo Max SoloStar) 300 UNIT/ML solution pen-injector injection, Inject 60 units BID, Disp: 36 mL, Rfl: 1    Jardiance 25 MG tablet tablet, TAKE 1 TABLET DAILY, Disp: 90 tablet, Rfl: 3    levothyroxine (SYNTHROID, LEVOTHROID) 125 MCG tablet, TAKE 1 TABLET DAILY, Disp: 90 tablet, Rfl: 0    lisinopril (PRINIVIL,ZESTRIL) 40 MG tablet, TAKE 1 TABLET DAILY, Disp: 30 tablet, Rfl: 0    nadolol (CORGARD) 20 MG tablet, Take 1 tablet by mouth Daily., Disp: , Rfl:     OneTouch Verio test strip, USE TO TEST BLOOD SUGAR THREE TIMES DAILY DX CODE E11.65, Disp: 300 each, Rfl: 3    simvastatin (ZOCOR) 40 MG tablet,  TAKE 1 TABLET DAILY (Patient taking differently: Refills to EXPRESS SCRIPT), Disp: 90 tablet, Rfl: 1    Insulin Pen Needle (BD Pen Needle Micro U/F) 32G X 6 MM misc, Use daily with insulin pens. (Patient not taking: Reported on 10/8/2024), Disp: 100 each, Rfl: 11    Ultra Thin Lancets 31G misc, Ultra Thin Lancets 31 gauge (Patient not taking: Reported on 10/8/2024), Disp: , Rfl:     Patient Active Problem List    Diagnosis     Spondylolisthesis of lumbar region [M43.16]     Anxiety [F41.9]     Diabetic peripheral neuropathy [E11.42]     History of colonic polyps [Z86.0100]     Corneal ulcer [H16.009]     Age-related nuclear cataract of both eyes [H25.13]     Hypermetropia [H52.00]     Presbyopia [H52.4]     Regular astigmatism [H52.229]     Type 2 diabetes mellitus with hyperglycemia, with long-term current use of insulin [E11.65, Z79.4]     Benign essential hypertension [I10]     Microalbuminuria [R80.9]     Obesity [E66.9]     Mixed hyperlipidemia [E78.2]     Postoperative hypothyroidism [E89.0]      Review of Systems   Constitutional:  Negative for activity change, appetite change and unexpected weight change.   HENT:  Negative for congestion and rhinorrhea.    Eyes:  Negative for visual disturbance.   Respiratory:  Negative for cough and shortness of breath.    Cardiovascular:  Negative for palpitations and leg swelling.   Gastrointestinal:  Negative for constipation, diarrhea and nausea.   Genitourinary:  Negative for hematuria.   Musculoskeletal:  Negative for arthralgias, back pain, gait problem, joint swelling and myalgias.   Skin:  Negative for color change, rash and wound.   Allergic/Immunologic: Negative for environmental allergies, food allergies and immunocompromised state.   Neurological:  Negative for dizziness, weakness and light-headedness.   Psychiatric/Behavioral:  Negative for confusion, decreased concentration, dysphoric mood and sleep disturbance. The patient is not nervous/anxious.      Social  "History     Socioeconomic History    Marital status:    Tobacco Use    Smoking status: Never    Smokeless tobacco: Never   Vaping Use    Vaping status: Never Used   Substance and Sexual Activity    Alcohol use: Not Currently     Alcohol/week: 2.0 standard drinks of alcohol     Types: 2 Drinks containing 0.5 oz of alcohol per week     Comment: social    Drug use: Never    Sexual activity: Yes     Partners: Male     Birth control/protection: Post-menopausal     Family History   Problem Relation Age of Onset    Heart disease Mother     Thyroid disease Mother     Diabetes Father     Heart disease Father      /62   Pulse 73   Ht 162.6 cm (64.02\")   Wt 90.2 kg (198 lb 12.8 oz)   LMP 09/01/2008 Comment: post-menopausal  SpO2 94%   BMI 34.11 kg/m²   Physical Exam  Vitals and nursing note reviewed.   Constitutional:       Appearance: Normal appearance. She is well-developed.   HENT:      Head: Normocephalic and atraumatic.   Eyes:      General: Lids are normal.      Extraocular Movements: Extraocular movements intact.      Conjunctiva/sclera: Conjunctivae normal.      Pupils: Pupils are equal, round, and reactive to light.   Neck:      Thyroid: No thyroid mass or thyromegaly.      Vascular: No carotid bruit.      Trachea: Trachea normal. No tracheal deviation.   Cardiovascular:      Rate and Rhythm: Normal rate and regular rhythm.      Pulses: Normal pulses.      Heart sounds: Normal heart sounds. No murmur heard.     No friction rub. No gallop.   Pulmonary:      Effort: Pulmonary effort is normal. No respiratory distress.      Breath sounds: Normal breath sounds. No wheezing.   Musculoskeletal:         General: No deformity. Normal range of motion.      Cervical back: Normal range of motion and neck supple.   Lymphadenopathy:      Cervical: No cervical adenopathy.   Skin:     General: Skin is warm and dry.      Findings: No erythema or rash.      Nails: There is no clubbing.   Neurological:      General: " No focal deficit present.      Mental Status: She is alert and oriented to person, place, and time.      Cranial Nerves: No cranial nerve deficit.      Deep Tendon Reflexes: Reflexes are normal and symmetric. Reflexes normal.   Psychiatric:         Mood and Affect: Mood normal.         Speech: Speech normal.         Behavior: Behavior normal.         Thought Content: Thought content normal.         Judgment: Judgment normal.       Results for orders placed or performed in visit on 10/08/24   POC Glucose, Blood    Specimen: Blood   Result Value Ref Range    Glucose 130 70 - 130 mg/dL    Lot Number [id number]     Expiration Date 5/24/2025    POC Glycosylated Hemoglobin (Hb A1C)    Specimen: Blood   Result Value Ref Range    Hemoglobin A1C 8.1 (A) 4.5 - 5.7 %    Lot Number [id number]     Expiration Date 6/20/2026      Diagnoses and all orders for this visit:    1. Type 2 diabetes mellitus with hyperglycemia, with long-term current use of insulin (Primary)  Assessment & Plan:  Blood sugar and 90 day average sugar reviewed  Results for orders placed or performed in visit on 10/08/24   POC Glucose, Blood    Specimen: Blood   Result Value Ref Range    Glucose 130 70 - 130 mg/dL    Lot Number [id number]     Expiration Date 5/24/2025    POC Glycosylated Hemoglobin (Hb A1C)    Specimen: Blood   Result Value Ref Range    Hemoglobin A1C 8.1 (A) 4.5 - 5.7 %    Lot Number [id number]     Expiration Date 6/20/2026      Average sugar 180   Target discussed 150 or less  Uncontrolled diabetes with hyperglycemia discussed  Continue trulicity, lantus and jardiance  She plans to reduce dietary carbs and increase activity- back in home and cooking     Orders:  -     POC Glucose, Blood  -     POC Glycosylated Hemoglobin (Hb A1C)    2. Benign essential hypertension  Assessment & Plan:  Bp is well controlled  Continue monitoring and medications      Return in about 3 months (around 1/8/2025) for Recheck.    Electronically signed by: Ruth Ann  CIERRA Rodríguez MD

## 2024-10-08 NOTE — ASSESSMENT & PLAN NOTE
Blood sugar and 90 day average sugar reviewed  Results for orders placed or performed in visit on 10/08/24   POC Glucose, Blood    Specimen: Blood   Result Value Ref Range    Glucose 130 70 - 130 mg/dL    Lot Number [id number]     Expiration Date 5/24/2025    POC Glycosylated Hemoglobin (Hb A1C)    Specimen: Blood   Result Value Ref Range    Hemoglobin A1C 8.1 (A) 4.5 - 5.7 %    Lot Number [id number]     Expiration Date 6/20/2026      Average sugar 180   Target discussed 150 or less  Uncontrolled diabetes with hyperglycemia discussed  Continue trulicity, lantus and jardiance  She plans to reduce dietary carbs and increase activity- back in home and cooking

## 2024-10-24 DIAGNOSIS — E89.0 POSTOPERATIVE HYPOTHYROIDISM: Chronic | ICD-10-CM

## 2024-10-24 RX ORDER — LEVOTHYROXINE SODIUM 125 UG/1
TABLET ORAL
Qty: 90 TABLET | Refills: 0 | Status: SHIPPED | OUTPATIENT
Start: 2024-10-24

## 2024-10-24 NOTE — TELEPHONE ENCOUNTER
Rx Refill Note  Requested Prescriptions     Pending Prescriptions Disp Refills    levothyroxine (SYNTHROID, LEVOTHROID) 125 MCG tablet [Pharmacy Med Name: L-THYROXINE (SYNTHROID) TABS 125MCG] 90 tablet 3     Sig: TAKE 1 TABLET DAILY      Last office visit with prescribing clinician: Visit date not found   Last telemedicine visit with prescribing clinician: Visit date not found   Next office visit with prescribing clinician: Visit date not found                         Would you like a call back once the refill request has been completed: [] Yes [] No    If the office needs to give you a call back, can they leave a voicemail: [] Yes [] No    Rocío Bailey MA  10/24/24, 08:19 EDT

## 2024-11-02 DIAGNOSIS — I10 BENIGN ESSENTIAL HYPERTENSION: Chronic | ICD-10-CM

## 2024-11-04 RX ORDER — LISINOPRIL 40 MG/1
40 TABLET ORAL DAILY
Qty: 90 TABLET | Refills: 1 | Status: SHIPPED | OUTPATIENT
Start: 2024-11-04

## 2024-12-16 RX ORDER — DULAGLUTIDE 4.5 MG/.5ML
INJECTION, SOLUTION SUBCUTANEOUS
Qty: 6 ML | Refills: 3 | Status: SHIPPED | OUTPATIENT
Start: 2024-12-16

## 2024-12-30 RX ORDER — INSULIN GLARGINE 300 U/ML
INJECTION, SOLUTION SUBCUTANEOUS
Qty: 36 ML | Refills: 1 | Status: SHIPPED | OUTPATIENT
Start: 2024-12-30

## 2024-12-30 NOTE — TELEPHONE ENCOUNTER
Rx Refill Note  Requested Prescriptions     Pending Prescriptions Disp Refills    Insulin Glargine, 2 Unit Dial, (Toujeo Max SoloStar) 300 UNIT/ML solution pen-injector injection [Pharmacy Med Name: TOUJEO MAX SOLOSTAR PEN 3ML2S 300U/ML] 36 mL 3     Sig: INJECT 60 UNITS TWICE A DAY      Last office visit with prescribing clinician: 10/8/2024   Last telemedicine visit with prescribing clinician: Visit date not found   Next office visit with prescribing clinician: 1/9/2025                         Would you like a call back once the refill request has been completed: [] Yes [] No    If the office needs to give you a call back, can they leave a voicemail: [] Yes [] No    Alison Gaxiola MA  12/30/24, 11:44 EST

## 2025-01-09 ENCOUNTER — OFFICE VISIT (OUTPATIENT)
Dept: ENDOCRINOLOGY | Facility: CLINIC | Age: 70
End: 2025-01-09
Payer: MEDICARE

## 2025-01-09 VITALS
OXYGEN SATURATION: 97 % | BODY MASS INDEX: 34.21 KG/M2 | SYSTOLIC BLOOD PRESSURE: 118 MMHG | WEIGHT: 200.4 LBS | HEART RATE: 75 BPM | DIASTOLIC BLOOD PRESSURE: 66 MMHG | HEIGHT: 64 IN

## 2025-01-09 DIAGNOSIS — I10 BENIGN ESSENTIAL HYPERTENSION: Chronic | ICD-10-CM

## 2025-01-09 DIAGNOSIS — E11.65 TYPE 2 DIABETES MELLITUS WITH HYPERGLYCEMIA, WITH LONG-TERM CURRENT USE OF INSULIN: Primary | ICD-10-CM

## 2025-01-09 DIAGNOSIS — E89.0 POSTOPERATIVE HYPOTHYROIDISM: Chronic | ICD-10-CM

## 2025-01-09 DIAGNOSIS — Z79.4 TYPE 2 DIABETES MELLITUS WITH HYPERGLYCEMIA, WITH LONG-TERM CURRENT USE OF INSULIN: Primary | ICD-10-CM

## 2025-01-09 LAB
ALBUMIN SERPL-MCNC: 4 G/DL (ref 3.5–5.2)
ALBUMIN/GLOB SERPL: 1.1 G/DL
ALP SERPL-CCNC: 122 U/L (ref 39–117)
ALT SERPL W P-5'-P-CCNC: 20 U/L (ref 1–33)
ANION GAP SERPL CALCULATED.3IONS-SCNC: 7 MMOL/L (ref 5–15)
AST SERPL-CCNC: 20 U/L (ref 1–32)
BILIRUB SERPL-MCNC: 0.4 MG/DL (ref 0–1.2)
BUN SERPL-MCNC: 16 MG/DL (ref 8–23)
BUN/CREAT SERPL: 19.3 (ref 7–25)
CALCIUM SPEC-SCNC: 10.1 MG/DL (ref 8.6–10.5)
CHLORIDE SERPL-SCNC: 102 MMOL/L (ref 98–107)
CHOLEST SERPL-MCNC: 216 MG/DL (ref 0–200)
CO2 SERPL-SCNC: 29 MMOL/L (ref 22–29)
CREAT SERPL-MCNC: 0.83 MG/DL (ref 0.57–1)
EGFRCR SERPLBLD CKD-EPI 2021: 77.4 ML/MIN/1.73
EXPIRATION DATE: ABNORMAL
EXPIRATION DATE: NORMAL
GLOBULIN UR ELPH-MCNC: 3.5 GM/DL
GLUCOSE BLDC GLUCOMTR-MCNC: 93 MG/DL (ref 70–130)
GLUCOSE SERPL-MCNC: 97 MG/DL (ref 65–99)
HBA1C MFR BLD: 7.9 % (ref 4.5–5.7)
HDLC SERPL-MCNC: 47 MG/DL (ref 40–60)
LDLC SERPL CALC-MCNC: 126 MG/DL (ref 0–100)
LDLC/HDLC SERPL: 2.55 {RATIO}
Lab: ABNORMAL
Lab: NORMAL
POTASSIUM SERPL-SCNC: 4.7 MMOL/L (ref 3.5–5.2)
PROT SERPL-MCNC: 7.5 G/DL (ref 6–8.5)
SODIUM SERPL-SCNC: 138 MMOL/L (ref 136–145)
T4 FREE SERPL-MCNC: 1.43 NG/DL (ref 0.92–1.68)
TRIGL SERPL-MCNC: 246 MG/DL (ref 0–150)
TSH SERPL DL<=0.05 MIU/L-ACNC: 0.35 UIU/ML (ref 0.27–4.2)
VLDLC SERPL-MCNC: 43 MG/DL (ref 5–40)

## 2025-01-09 PROCEDURE — 82947 ASSAY GLUCOSE BLOOD QUANT: CPT | Performed by: INTERNAL MEDICINE

## 2025-01-09 PROCEDURE — 3074F SYST BP LT 130 MM HG: CPT | Performed by: INTERNAL MEDICINE

## 2025-01-09 PROCEDURE — 99214 OFFICE O/P EST MOD 30 MIN: CPT | Performed by: INTERNAL MEDICINE

## 2025-01-09 PROCEDURE — 84443 ASSAY THYROID STIM HORMONE: CPT | Performed by: INTERNAL MEDICINE

## 2025-01-09 PROCEDURE — 84439 ASSAY OF FREE THYROXINE: CPT | Performed by: INTERNAL MEDICINE

## 2025-01-09 PROCEDURE — 80061 LIPID PANEL: CPT | Performed by: INTERNAL MEDICINE

## 2025-01-09 PROCEDURE — 83036 HEMOGLOBIN GLYCOSYLATED A1C: CPT | Performed by: INTERNAL MEDICINE

## 2025-01-09 PROCEDURE — 3078F DIAST BP <80 MM HG: CPT | Performed by: INTERNAL MEDICINE

## 2025-01-09 PROCEDURE — 1160F RVW MEDS BY RX/DR IN RCRD: CPT | Performed by: INTERNAL MEDICINE

## 2025-01-09 PROCEDURE — 3051F HG A1C>EQUAL 7.0%<8.0%: CPT | Performed by: INTERNAL MEDICINE

## 2025-01-09 PROCEDURE — 80053 COMPREHEN METABOLIC PANEL: CPT | Performed by: INTERNAL MEDICINE

## 2025-01-09 PROCEDURE — 1159F MED LIST DOCD IN RCRD: CPT | Performed by: INTERNAL MEDICINE

## 2025-01-09 PROCEDURE — 36415 COLL VENOUS BLD VENIPUNCTURE: CPT | Performed by: INTERNAL MEDICINE

## 2025-01-09 NOTE — PROGRESS NOTES
Aida Sigala 69 y.o.  CC: follow up II Diabetes Mellitus, hypothyroid, hypertension, hyperlipidemia    [place]: follow up II Diabetes Mellitus, hypothyroid, hypertension, hyperlipidemia    BP is good  Is taking synthroid 125 mcg daily   Is taking zocor 40 mg daily   Blood sugar and 90 day average sugar reviewed    Results for orders placed or performed in visit on 01/09/25   POC Glucose, Blood    Collection Time: 01/09/25  3:57 PM    Specimen: Blood   Result Value Ref Range    Glucose 93 70 - 130 mg/dL    Lot Number [id number]     Expiration Date 07/25/2025    POC Glycosylated Hemoglobin (Hb A1C)    Collection Time: 01/09/25  3:59 PM    Specimen: Blood   Result Value Ref Range    Hemoglobin A1C 7.9 (A) 4.5 - 5.7 %    Lot Number [id number]     Expiration Date 10/11/2026      Average sugar is 180, down from last check   Is taking trulicity, jardiance and toujeo   Blood sugars good overall  Known high ur alb  Lab 6/24 reviewed    Allergies   Allergen Reactions    Sulfa Antibiotics Hives    Covid-19 Mrna Vacc (Moderna) Rash     Only the Moderna/Pfizer COVID vaccine       Current Outpatient Medications:     aspirin 81 MG EC tablet, Take 1 tablet by mouth Daily., Disp: , Rfl:     citalopram (CeleXA) 10 MG tablet, Take 1 tablet by mouth Daily., Disp: , Rfl:     Insulin Glargine, 2 Unit Dial, (Toujeo Max SoloStar) 300 UNIT/ML solution pen-injector injection, INJECT 60 UNITS TWICE A DAY, Disp: 36 mL, Rfl: 1    Insulin Pen Needle (BD Pen Needle Micro U/F) 32G X 6 MM misc, Use daily with insulin pens., Disp: 100 each, Rfl: 11    Jardiance 25 MG tablet tablet, TAKE 1 TABLET DAILY, Disp: 90 tablet, Rfl: 3    levothyroxine (SYNTHROID, LEVOTHROID) 125 MCG tablet, TAKE 1 TABLET DAILY, Disp: 90 tablet, Rfl: 0    lisinopril (PRINIVIL,ZESTRIL) 40 MG tablet, TAKE 1 TABLET DAILY, Disp: 90 tablet, Rfl: 1    nadolol (CORGARD) 20 MG tablet, Take 1 tablet by mouth Daily., Disp: , Rfl:     OneTouch Verio test strip, USE TO TEST BLOOD SUGAR THREE  TIMES DAILY DX CODE E11.65, Disp: 300 each, Rfl: 3    simvastatin (ZOCOR) 40 MG tablet, TAKE 1 TABLET DAILY (Patient taking differently: Refills to [facility]), Disp: 90 tablet, Rfl: 1    Trulicity 4.5 MG/0.5ML solution auto-injector, INJECT 0.5 ML UNDER THE SKIN INTO THE APPROPRIATE AREA AS DIRECTED ONCE A WEEK, Disp: 6 mL, Rfl: 3    Ultra Thin Lancets 31G misc, , Disp: , Rfl:     Patient Active Problem List    Diagnosis     Spondylolisthesis of lumbar region [M43.16]     Anxiety [F41.9]     Diabetic peripheral neuropathy [E11.42]     History of colonic polyps [Z86.0100]     Corneal ulcer [H16.009]     Age-related nuclear cataract of both eyes [H25.13]     Hypermetropia [H52.00]     Presbyopia [H52.4]     Regular astigmatism [H52.229]     Type 2 diabetes mellitus with hyperglycemia, with long-term current use of insulin [E11.65, Z79.4]     Benign essential hypertension [I10]     Microalbuminuria [R80.9]     Obesity [E66.9]     Mixed hyperlipidemia [E78.2]     Postoperative hypothyroidism [E89.0]      Review of Systems   Constitutional:  Negative for activity change, appetite change and unexpected weight change.   HENT:  Negative for congestion and rhinorrhea.    Eyes:  Negative for visual disturbance.   Respiratory:  Negative for cough and shortness of breath.    Cardiovascular:  Negative for palpitations and leg swelling.   Gastrointestinal:  Negative for constipation, diarrhea and nausea.   Genitourinary:  Negative for hematuria.   Musculoskeletal:  Positive for back pain. Negative for arthralgias, gait problem, joint swelling and myalgias.   Skin:  Negative for color change, rash and wound.   Allergic/Immunologic: Negative for environmental allergies, food allergies and immunocompromised state.   Neurological:  Positive for weakness. Negative for dizziness and light-headedness.   Psychiatric/Behavioral:  Negative for confusion, decreased concentration, dysphoric mood and sleep disturbance. The patient is not  "nervous/anxious.      Social History     Socioeconomic History    Marital status:    Tobacco Use    Smoking status: Never    Smokeless tobacco: Never   Vaping Use    Vaping status: Never Used   Substance and Sexual Activity    Alcohol use: Not Currently     Alcohol/week: 2.0 standard drinks of alcohol     Types: 2 Drinks containing 0.5 oz of alcohol per week     Comment: social    Drug use: Never    Sexual activity: Yes     Partners: Male     Birth control/protection: Post-menopausal     Family History   Problem Relation Age of Onset    Heart disease Mother     Thyroid disease Mother     Diabetes Father     Heart disease Father      /66 (BP Location: Left arm, Patient Position: Sitting)   Pulse 75   Ht 162.6 cm (64.02\")   Wt 90.9 kg (200 lb 6.4 oz)   LMP 09/01/2008 Comment: post-menopausal  SpO2 97%   BMI 34.38 kg/m²     Physical Exam  Vitals and nursing note reviewed.   Constitutional:       Appearance: Normal appearance. She is well-developed.   HENT:      Head: Normocephalic and atraumatic.   Eyes:      General: Lids are normal.      Extraocular Movements: Extraocular movements intact.      Conjunctiva/sclera: Conjunctivae normal.      Pupils: Pupils are equal, round, and reactive to light.   Neck:      Thyroid: No thyroid mass or thyromegaly.      Vascular: No carotid bruit.      Trachea: Trachea normal. No tracheal deviation.   Cardiovascular:      Rate and Rhythm: Normal rate and regular rhythm.      Heart sounds: Normal heart sounds. No murmur heard.     No friction rub. No gallop.   Pulmonary:      Effort: Pulmonary effort is normal. No respiratory distress.      Breath sounds: Normal breath sounds. No wheezing.   Musculoskeletal:         General: No deformity. Normal range of motion.      Cervical back: Normal range of motion and neck supple.   Lymphadenopathy:      Cervical: No cervical adenopathy.   Skin:     General: Skin is warm and dry.      Findings: No erythema or rash.      Nails: " There is no clubbing.   Neurological:      Mental Status: She is alert and oriented to person, place, and time.      Cranial Nerves: No cranial nerve deficit.      Sensory: Sensory deficit present.      Deep Tendon Reflexes: Reflexes are normal and symmetric. Reflexes normal.   Psychiatric:         Speech: Speech normal.         Behavior: Behavior normal.         Thought Content: Thought content normal.         Judgment: Judgment normal.       Results for orders placed or performed in visit on 01/09/25   POC Glucose, Blood    Collection Time: 01/09/25  3:57 PM    Specimen: Blood   Result Value Ref Range    Glucose 93 70 - 130 mg/dL    Lot Number [id number]     Expiration Date 07/25/2025    POC Glycosylated Hemoglobin (Hb A1C)    Collection Time: 01/09/25  3:59 PM    Specimen: Blood   Result Value Ref Range    Hemoglobin A1C 7.9 (A) 4.5 - 5.7 %    Lot Number [id number]     Expiration Date 10/11/2026      Diagnoses and all orders for this visit:    1. Type 2 diabetes mellitus with hyperglycemia, with long-term current use of insulin (Primary)  Assessment & Plan:  Blood sugar and 90 day average sugar reviewed  Results for orders placed or performed in visit on 01/09/25   POC Glucose, Blood    Collection Time: 01/09/25  3:57 PM    Specimen: Blood   Result Value Ref Range    Glucose 93 70 - 130 mg/dL    Lot Number [id number]     Expiration Date 07/25/2025    POC Glycosylated Hemoglobin (Hb A1C)    Collection Time: 01/09/25  3:59 PM    Specimen: Blood   Result Value Ref Range    Hemoglobin A1C 7.9 (A) 4.5 - 5.7 %    Lot Number [id number]     Expiration Date 10/11/2026      Average sugar 180- improved compared with prior testing   Target discussed   Uncontrolled diabetes with hyperglycemia discussed   Is utd with eye exam  Stable neuropathy without foot lesion   Ur alb high     Orders:  -     POC Glucose, Blood  -     POC Glycosylated Hemoglobin (Hb A1C)    2. Postoperative hypothyroidism  Assessment & Plan:  Taking  levothyroxine 125 mcg daily   Check tfts     Orders:  -     T4, Free; Future  -     TSH; Future  -     Comprehensive Metabolic Panel; Future  -     Lipid Panel; Future  -     T4, Free  -     TSH  -     Comprehensive Metabolic Panel  -     Lipid Panel    3. Benign essential hypertension  Assessment & Plan:  BP is good - continue monitoring and lisinopril       Consider sensor  Return in about 3 months (around 4/9/2025) for Recheck.    Electronically signed by: Ruth Ann Rodríguez MD

## 2025-01-10 NOTE — ASSESSMENT & PLAN NOTE
Blood sugar and 90 day average sugar reviewed  Results for orders placed or performed in visit on 01/09/25   POC Glucose, Blood    Collection Time: 01/09/25  3:57 PM    Specimen: Blood   Result Value Ref Range    Glucose 93 70 - 130 mg/dL    Lot Number [id number]     Expiration Date 07/25/2025    POC Glycosylated Hemoglobin (Hb A1C)    Collection Time: 01/09/25  3:59 PM    Specimen: Blood   Result Value Ref Range    Hemoglobin A1C 7.9 (A) 4.5 - 5.7 %    Lot Number [id number]     Expiration Date 10/11/2026      Average sugar 180- improved compared with prior testing   Target discussed   Uncontrolled diabetes with hyperglycemia discussed   Is utd with eye exam  Stable neuropathy without foot lesion   Ur alb high

## 2025-01-14 DIAGNOSIS — Z79.4 TYPE 2 DIABETES MELLITUS WITH HYPERGLYCEMIA, WITH LONG-TERM CURRENT USE OF INSULIN: Chronic | ICD-10-CM

## 2025-01-14 DIAGNOSIS — E11.65 TYPE 2 DIABETES MELLITUS WITH HYPERGLYCEMIA, WITH LONG-TERM CURRENT USE OF INSULIN: Chronic | ICD-10-CM

## 2025-01-14 DIAGNOSIS — I10 BENIGN ESSENTIAL HYPERTENSION: Chronic | ICD-10-CM

## 2025-01-14 DIAGNOSIS — E89.0 POSTOPERATIVE HYPOTHYROIDISM: Chronic | ICD-10-CM

## 2025-01-14 RX ORDER — NADOLOL 20 MG/1
20 TABLET ORAL DAILY
Qty: 90 TABLET | Refills: 1 | Status: SHIPPED | OUTPATIENT
Start: 2025-01-14

## 2025-01-14 RX ORDER — DULAGLUTIDE 4.5 MG/.5ML
4.5 INJECTION, SOLUTION SUBCUTANEOUS WEEKLY
Qty: 6 ML | Refills: 1 | Status: SHIPPED | OUTPATIENT
Start: 2025-01-14

## 2025-01-14 RX ORDER — LEVOTHYROXINE SODIUM 125 UG/1
125 TABLET ORAL DAILY
Qty: 90 TABLET | Refills: 1 | Status: SHIPPED | OUTPATIENT
Start: 2025-01-14

## 2025-01-14 RX ORDER — LISINOPRIL 40 MG/1
40 TABLET ORAL DAILY
Qty: 90 TABLET | Refills: 1 | Status: SHIPPED | OUTPATIENT
Start: 2025-01-14

## 2025-01-14 RX ORDER — BLOOD SUGAR DIAGNOSTIC
STRIP MISCELLANEOUS
Qty: 300 EACH | Refills: 1 | Status: SHIPPED | OUTPATIENT
Start: 2025-01-14

## 2025-01-14 RX ORDER — INSULIN GLARGINE 300 U/ML
INJECTION, SOLUTION SUBCUTANEOUS
Qty: 36 ML | Refills: 1 | Status: SHIPPED | OUTPATIENT
Start: 2025-01-14

## 2025-01-22 DIAGNOSIS — E89.0 POSTOPERATIVE HYPOTHYROIDISM: Chronic | ICD-10-CM

## 2025-01-22 RX ORDER — LEVOTHYROXINE SODIUM 125 UG/1
125 TABLET ORAL DAILY
Qty: 90 TABLET | Refills: 0 | OUTPATIENT
Start: 2025-01-22

## 2025-01-28 DIAGNOSIS — E11.65 TYPE 2 DIABETES MELLITUS WITH HYPERGLYCEMIA, WITH LONG-TERM CURRENT USE OF INSULIN: Chronic | ICD-10-CM

## 2025-01-28 DIAGNOSIS — Z79.4 TYPE 2 DIABETES MELLITUS WITH HYPERGLYCEMIA, WITH LONG-TERM CURRENT USE OF INSULIN: Chronic | ICD-10-CM

## 2025-01-28 DIAGNOSIS — I10 BENIGN ESSENTIAL HYPERTENSION: Chronic | ICD-10-CM

## 2025-01-28 DIAGNOSIS — E89.0 POSTOPERATIVE HYPOTHYROIDISM: Chronic | ICD-10-CM

## 2025-01-28 RX ORDER — LEVOTHYROXINE SODIUM 125 UG/1
125 TABLET ORAL DAILY
Qty: 90 TABLET | Refills: 1 | Status: SHIPPED | OUTPATIENT
Start: 2025-01-28

## 2025-01-28 RX ORDER — PEN NEEDLE, DIABETIC 32 GX 1/4"
NEEDLE, DISPOSABLE MISCELLANEOUS
Qty: 100 EACH | Refills: 11 | Status: SHIPPED | OUTPATIENT
Start: 2025-01-28

## 2025-01-28 RX ORDER — INSULIN GLARGINE 300 U/ML
INJECTION, SOLUTION SUBCUTANEOUS
Qty: 36 ML | Refills: 1 | Status: SHIPPED | OUTPATIENT
Start: 2025-01-28

## 2025-01-28 RX ORDER — LISINOPRIL 40 MG/1
40 TABLET ORAL DAILY
Qty: 90 TABLET | Refills: 1 | Status: SHIPPED | OUTPATIENT
Start: 2025-01-28

## 2025-01-28 RX ORDER — NADOLOL 20 MG/1
20 TABLET ORAL DAILY
Qty: 90 TABLET | Refills: 1 | Status: SHIPPED | OUTPATIENT
Start: 2025-01-28

## 2025-01-28 RX ORDER — BLOOD SUGAR DIAGNOSTIC
STRIP MISCELLANEOUS
Qty: 300 EACH | Refills: 1 | Status: SHIPPED | OUTPATIENT
Start: 2025-01-28

## 2025-01-28 RX ORDER — DULAGLUTIDE 4.5 MG/.5ML
4.5 INJECTION, SOLUTION SUBCUTANEOUS WEEKLY
Qty: 6 ML | Refills: 1 | Status: SHIPPED | OUTPATIENT
Start: 2025-01-28

## 2025-05-08 ENCOUNTER — TELEPHONE (OUTPATIENT)
Dept: ENDOCRINOLOGY | Facility: CLINIC | Age: 70
End: 2025-05-08
Payer: MEDICARE

## 2025-05-08 NOTE — TELEPHONE ENCOUNTER
Patient called wanting to see when her appointment was. Advised she did not have an appt scheduled. She was able to schedule today.

## 2025-06-12 ENCOUNTER — OFFICE VISIT (OUTPATIENT)
Dept: ENDOCRINOLOGY | Facility: CLINIC | Age: 70
End: 2025-06-12
Payer: MEDICARE

## 2025-06-12 VITALS
HEIGHT: 64 IN | BODY MASS INDEX: 35.68 KG/M2 | SYSTOLIC BLOOD PRESSURE: 102 MMHG | HEART RATE: 71 BPM | WEIGHT: 209 LBS | DIASTOLIC BLOOD PRESSURE: 68 MMHG | OXYGEN SATURATION: 94 %

## 2025-06-12 DIAGNOSIS — E11.65 TYPE 2 DIABETES MELLITUS WITH HYPERGLYCEMIA, WITH LONG-TERM CURRENT USE OF INSULIN: Primary | ICD-10-CM

## 2025-06-12 DIAGNOSIS — E89.0 POSTOPERATIVE HYPOTHYROIDISM: Chronic | ICD-10-CM

## 2025-06-12 DIAGNOSIS — Z79.4 TYPE 2 DIABETES MELLITUS WITH HYPERGLYCEMIA, WITH LONG-TERM CURRENT USE OF INSULIN: Primary | ICD-10-CM

## 2025-06-12 DIAGNOSIS — I10 BENIGN ESSENTIAL HYPERTENSION: Chronic | ICD-10-CM

## 2025-06-12 LAB
EXPIRATION DATE: ABNORMAL
EXPIRATION DATE: ABNORMAL
GLUCOSE BLDC GLUCOMTR-MCNC: 159 MG/DL (ref 70–130)
HBA1C MFR BLD: 8.1 % (ref 4.5–5.7)
Lab: ABNORMAL
Lab: ABNORMAL

## 2025-06-12 RX ORDER — DULAGLUTIDE 4.5 MG/.5ML
4.5 INJECTION, SOLUTION SUBCUTANEOUS WEEKLY
Qty: 6 ML | Refills: 1 | Status: SHIPPED | OUTPATIENT
Start: 2025-06-12

## 2025-06-12 NOTE — PROGRESS NOTES
"Aida Sigala 70 y.o.  [facility]: follow up II Diabetes, Hypertension, Hypothyroid     [place]: follow up II Diabetes, Hypertension, Hypothyroid  Blood sugar and 90 day average sugar reviewed  Results for orders placed or performed in visit on 06/12/25   POC Glycosylated Hemoglobin (Hb A1C)    Collection Time: 06/12/25  2:04 PM    Specimen: Blood   Result Value Ref Range    Hemoglobin A1C 8.1 (A) 4.5 - 5.7 %    Lot Number [id number]     Expiration Date 02/26/2027    POC Glucose, Blood    Collection Time: 06/12/25  2:04 PM    Specimen: Blood   Result Value Ref Range    Glucose 159 (A) 70 - 130 mg/dL    Lot Number [id number]     Expiration Date 12/27/2025      Interim steroid shots- bilateral knees, lower back   Has been more thirsty - plans to avoid steroid shots \"for awhile\"  Has eye exam scheduled for July   BP is good     Allergies   Allergen Reactions    Sulfa Antibiotics Hives    Covid-19 Mrna Vacc (Moderna) Rash     Only the Moderna/Pfizer COVID vaccine       Current Outpatient Medications:     aspirin 81 MG EC tablet, Take 1 tablet by mouth Daily., Disp: , Rfl:     citalopram (CeleXA) 10 MG tablet, Take 1 tablet by mouth Daily., Disp: , Rfl:     Dulaglutide (Trulicity) 4.5 MG/0.5ML solution auto-injector, Inject 4.5 mg under the skin into the appropriate area as directed 1 (One) Time Per Week., Disp: 6 mL, Rfl: 1    empagliflozin (Jardiance) 25 MG tablet tablet, Take 1 tablet by mouth Daily., Disp: 90 tablet, Rfl: 1    Insulin Glargine, 2 Unit Dial, (Toujeo Max SoloStar) 300 UNIT/ML solution pen-injector injection, INJECT 60 UNITS TWICE A DAY, Disp: 36 mL, Rfl: 1    Insulin Pen Needle (BD Pen Needle Micro U/F) 32G X 6 MM misc, Use daily with insulin pens., Disp: 100 each, Rfl: 11    levothyroxine (SYNTHROID, LEVOTHROID) 125 MCG tablet, Take 1 tablet by mouth Daily., Disp: 90 tablet, Rfl: 1    lisinopril (PRINIVIL,ZESTRIL) 40 MG tablet, Take 1 tablet by mouth Daily., Disp: 90 tablet, Rfl: 1    nadolol (CORGARD) 20 MG " tablet, Take 1 tablet by mouth Daily., Disp: 90 tablet, Rfl: 1    OneTouch Verio test strip, USE TO TEST BLOOD SUGAR THREE TIMES DAILY DX CODE E11.65, Disp: 300 each, Rfl: 1    simvastatin (ZOCOR) 40 MG tablet, TAKE 1 TABLET DAILY (Patient taking differently: Refills to [facility]), Disp: 90 tablet, Rfl: 1    Ultra Thin Lancets 31G misc, , Disp: , Rfl:     Patient Active Problem List    Diagnosis     Spondylolisthesis of lumbar region [M43.16]     Anxiety [F41.9]     Diabetic peripheral neuropathy [E11.42]     History of colonic polyps [Z86.0100]     Corneal ulcer [H16.009]     Age-related nuclear cataract of both eyes [H25.13]     Hypermetropia [H52.00]     Presbyopia [H52.4]     Regular astigmatism [H52.229]     Type 2 diabetes mellitus with hyperglycemia, with long-term current use of insulin [E11.65, Z79.4]     Benign essential hypertension [I10]     Microalbuminuria [R80.9]     Obesity [E66.9]     Mixed hyperlipidemia [E78.2]     Postoperative hypothyroidism [E89.0]      Review of Systems   Constitutional:  Negative for activity change, appetite change and unexpected weight change.   HENT:  Negative for congestion and rhinorrhea.    Eyes:  Negative for visual disturbance.   Respiratory:  Negative for cough and shortness of breath.    Cardiovascular:  Negative for palpitations and leg swelling.   Gastrointestinal:  Negative for constipation, diarrhea and nausea.   Genitourinary:  Negative for hematuria.   Musculoskeletal:  Positive for arthralgias, back pain and gait problem. Negative for joint swelling and myalgias.   Skin:  Negative for color change, rash and wound.   Allergic/Immunologic: Negative for environmental allergies, food allergies and immunocompromised state.   Neurological:  Negative for dizziness, weakness and light-headedness.   Psychiatric/Behavioral:  Negative for confusion, decreased concentration, dysphoric mood and sleep disturbance. The patient is not nervous/anxious.      Social  "History     Socioeconomic History    Marital status:    Tobacco Use    Smoking status: Never    Smokeless tobacco: Never   Vaping Use    Vaping status: Never Used   Substance and Sexual Activity    Alcohol use: Not Currently     Alcohol/week: 2.0 standard drinks of alcohol     Types: 2 Drinks containing 0.5 oz of alcohol per week     Comment: social    Drug use: Never    Sexual activity: Yes     Partners: Male     Birth control/protection: Post-menopausal     Family History   Problem Relation Age of Onset    Heart disease Mother     Thyroid disease Mother     Diabetes Father     Heart disease Father      /70 (BP Location: Right arm, Patient Position: Sitting, Cuff Size: Adult)   Pulse 71   Ht 162.6 cm (64\")   Wt 94.8 kg (209 lb)   LMP 09/01/2008 Comment: post-menopausal  SpO2 94%   BMI 35.87 kg/m²   Physical Exam  Vitals and nursing note reviewed.   Constitutional:       Appearance: Normal appearance. She is well-developed.   HENT:      Head: Normocephalic and atraumatic.   Eyes:      General: Lids are normal.      Conjunctiva/sclera: Conjunctivae normal.      Pupils: Pupils are equal, round, and reactive to light.   Neck:      Thyroid: No thyroid mass or thyromegaly.      Vascular: No carotid bruit.      Trachea: Trachea normal. No tracheal deviation.   Cardiovascular:      Rate and Rhythm: Normal rate and regular rhythm.      Heart sounds: Normal heart sounds. No murmur heard.     No friction rub. No gallop.   Pulmonary:      Effort: Pulmonary effort is normal. No respiratory distress.      Breath sounds: Normal breath sounds. No wheezing.   Musculoskeletal:         General: Swelling and deformity present. Normal range of motion.      Cervical back: Normal range of motion and neck supple.      Right lower leg: Edema present.      Left lower leg: Edema present.   Lymphadenopathy:      Cervical: No cervical adenopathy.   Skin:     General: Skin is warm and dry.      Findings: No erythema or rash. "      Nails: There is no clubbing.   Neurological:      Mental Status: She is alert and oriented to person, place, and time.      Cranial Nerves: No cranial nerve deficit.      Deep Tendon Reflexes: Reflexes are normal and symmetric. Reflexes normal.   Psychiatric:         Speech: Speech normal.         Behavior: Behavior normal.         Thought Content: Thought content normal.         Judgment: Judgment normal.       Results for orders placed or performed in visit on 06/12/25   POC Glycosylated Hemoglobin (Hb A1C)    Collection Time: 06/12/25  2:04 PM    Specimen: Blood   Result Value Ref Range    Hemoglobin A1C 8.1 (A) 4.5 - 5.7 %    Lot Number [id number]     Expiration Date 02/26/2027    POC Glucose, Blood    Collection Time: 06/12/25  2:04 PM    Specimen: Blood   Result Value Ref Range    Glucose 159 (A) 70 - 130 mg/dL    Lot Number [id number]     Expiration Date 12/27/2025      Diagnoses and all orders for this visit:    1. Type 2 diabetes mellitus with hyperglycemia, with long-term current use of insulin (Primary)  Assessment & Plan:  Blood sugar and 90 day average sugar reviewed  Results for orders placed or performed in visit on 06/12/25   POC Glycosylated Hemoglobin (Hb A1C)    Collection Time: 06/12/25  2:04 PM    Specimen: Blood   Result Value Ref Range    Hemoglobin A1C 8.1 (A) 4.5 - 5.7 %    Lot Number [id number]     Expiration Date 02/26/2027    POC Glucose, Blood    Collection Time: 06/12/25  2:04 PM    Specimen: Blood   Result Value Ref Range    Glucose 159 (A) 70 - 130 mg/dL    Lot Number [id number]     Expiration Date 12/27/2025      Average sugar is 180   Trulicity 4.5 mg weekly and toujeo,   Goals discussed  Continue monitoring and medication   F/u 3 months     Orders:  -     POC Glycosylated Hemoglobin (Hb A1C)  -     POC Glucose, Blood  -     Dulaglutide (Trulicity) 4.5 MG/0.5ML solution auto-injector; Inject 4.5 mg under the skin into the appropriate area as directed 1 (One) Time Per Week.   Dispense: 6 mL; Refill: 1    2. Benign essential hypertension  Assessment & Plan:  BP is controlled on current medication (corgard and lisinopril)  Continue monitoring       3. Postoperative hypothyroidism  Assessment & Plan:  Taking levothyroxine 125 mcg daily   Check tft s      Return in about 4 months (around 10/12/2025) for Recheck.    Electronically signed by: Ruth Ann Rodríguez MD

## 2025-06-12 NOTE — ASSESSMENT & PLAN NOTE
Blood sugar and 90 day average sugar reviewed  Results for orders placed or performed in visit on 06/12/25   POC Glycosylated Hemoglobin (Hb A1C)    Collection Time: 06/12/25  2:04 PM    Specimen: Blood   Result Value Ref Range    Hemoglobin A1C 8.1 (A) 4.5 - 5.7 %    Lot Number [id number]     Expiration Date 02/26/2027    POC Glucose, Blood    Collection Time: 06/12/25  2:04 PM    Specimen: Blood   Result Value Ref Range    Glucose 159 (A) 70 - 130 mg/dL    Lot Number [id number]     Expiration Date 12/27/2025      Average sugar is 180   Trulicity 4.5 mg weekly and toujeo,   Goals discussed  Continue monitoring and medication   F/u 3 months

## 2025-07-21 DIAGNOSIS — E89.0 POSTOPERATIVE HYPOTHYROIDISM: Chronic | ICD-10-CM

## 2025-07-21 RX ORDER — LEVOTHYROXINE SODIUM 125 UG/1
125 TABLET ORAL DAILY
Qty: 90 TABLET | Refills: 0 | Status: SHIPPED | OUTPATIENT
Start: 2025-07-21

## 2025-07-28 DIAGNOSIS — I10 BENIGN ESSENTIAL HYPERTENSION: Chronic | ICD-10-CM

## 2025-07-28 RX ORDER — LISINOPRIL 40 MG/1
40 TABLET ORAL DAILY
Qty: 90 TABLET | Refills: 3 | Status: SHIPPED | OUTPATIENT
Start: 2025-07-28

## 2025-07-31 DIAGNOSIS — Z79.4 TYPE 2 DIABETES MELLITUS WITH HYPERGLYCEMIA, WITH LONG-TERM CURRENT USE OF INSULIN: Chronic | ICD-10-CM

## 2025-07-31 DIAGNOSIS — E11.65 TYPE 2 DIABETES MELLITUS WITH HYPERGLYCEMIA, WITH LONG-TERM CURRENT USE OF INSULIN: Chronic | ICD-10-CM

## 2025-07-31 RX ORDER — EMPAGLIFLOZIN 25 MG/1
25 TABLET, FILM COATED ORAL DAILY
Qty: 90 TABLET | Refills: 0 | Status: SHIPPED | OUTPATIENT
Start: 2025-07-31

## 2025-07-31 NOTE — TELEPHONE ENCOUNTER
Rx Refill Note  Requested Prescriptions     Pending Prescriptions Disp Refills    empagliflozin (Jardiance) 25 MG tablet tablet [Pharmacy Med Name: JARDIANCE TABS 25MG] 90 tablet 0     Sig: TAKE 1 TABLET DAILY      Last office visit with prescribing clinician: 6/12/2025   Last telemedicine visit with prescribing clinician: Visit date not found   Next office visit with prescribing clinician: 10/13/2025                         Would you like a call back once the refill request has been completed: [] Yes [] No    If the office needs to give you a call back, can they leave a voicemail: [] Yes [] No    Alison Gaxiola MA  07/31/25, 09:00 EDT